# Patient Record
Sex: MALE | Race: WHITE | NOT HISPANIC OR LATINO | Employment: FULL TIME | URBAN - METROPOLITAN AREA
[De-identification: names, ages, dates, MRNs, and addresses within clinical notes are randomized per-mention and may not be internally consistent; named-entity substitution may affect disease eponyms.]

---

## 2020-10-19 ENCOUNTER — APPOINTMENT (OUTPATIENT)
Dept: GENERAL RADIOLOGY | Facility: CLINIC | Age: 60
End: 2020-10-19
Attending: EMERGENCY MEDICINE
Payer: COMMERCIAL

## 2020-10-19 ENCOUNTER — HOSPITAL ENCOUNTER (EMERGENCY)
Facility: CLINIC | Age: 60
Discharge: HOME | End: 2020-10-19
Attending: EMERGENCY MEDICINE | Admitting: EMERGENCY MEDICINE
Payer: COMMERCIAL

## 2020-10-19 VITALS
DIASTOLIC BLOOD PRESSURE: 82 MMHG | OXYGEN SATURATION: 96 % | SYSTOLIC BLOOD PRESSURE: 138 MMHG | RESPIRATION RATE: 18 BRPM | TEMPERATURE: 97.8 F | HEART RATE: 94 BPM

## 2020-10-19 DIAGNOSIS — J45.21 MILD INTERMITTENT ASTHMA WITH EXACERBATION: ICD-10-CM

## 2020-10-19 DIAGNOSIS — Z20.822 SUSPECTED COVID-19 VIRUS INFECTION: ICD-10-CM

## 2020-10-19 DIAGNOSIS — J20.9 ACUTE BRONCHITIS WITH COEXISTING CONDITION REQUIRING PROPHYLACTIC TREATMENT: ICD-10-CM

## 2020-10-19 DIAGNOSIS — J45.901 ASTHMA EXACERBATION: ICD-10-CM

## 2020-10-19 DIAGNOSIS — J45.20 INTERMITTENT ASTHMA: ICD-10-CM

## 2020-10-19 LAB
ALBUMIN SERPL-MCNC: 3.3 G/DL (ref 3.4–5)
ALP SERPL-CCNC: 132 U/L (ref 40–150)
ALT SERPL W P-5'-P-CCNC: 125 U/L (ref 0–70)
ANION GAP SERPL CALCULATED.3IONS-SCNC: 7 MMOL/L (ref 3–14)
AST SERPL W P-5'-P-CCNC: 107 U/L (ref 0–45)
BASOPHILS # BLD AUTO: 0 10E9/L (ref 0–0.2)
BASOPHILS NFR BLD AUTO: 0.2 %
BILIRUB SERPL-MCNC: 0.5 MG/DL (ref 0.2–1.3)
BUN SERPL-MCNC: 9 MG/DL (ref 7–30)
CALCIUM SERPL-MCNC: 8.7 MG/DL (ref 8.5–10.1)
CHLORIDE SERPL-SCNC: 100 MMOL/L (ref 94–109)
CO2 SERPL-SCNC: 27 MMOL/L (ref 20–32)
CREAT SERPL-MCNC: 0.9 MG/DL (ref 0.66–1.25)
CRP SERPL-MCNC: 57.8 MG/L (ref 0–8)
DIFFERENTIAL METHOD BLD: ABNORMAL
EOSINOPHIL # BLD AUTO: 0 10E9/L (ref 0–0.7)
EOSINOPHIL NFR BLD AUTO: 0 %
ERYTHROCYTE [DISTWIDTH] IN BLOOD BY AUTOMATED COUNT: 13.9 % (ref 10–15)
GFR SERPL CREATININE-BSD FRML MDRD: >90 ML/MIN/{1.73_M2}
GLUCOSE SERPL-MCNC: 98 MG/DL (ref 70–99)
HCT VFR BLD AUTO: 44.1 % (ref 40–53)
HGB BLD-MCNC: 14.8 G/DL (ref 13.3–17.7)
IMM GRANULOCYTES # BLD: 0 10E9/L (ref 0–0.4)
IMM GRANULOCYTES NFR BLD: 0.3 %
INTERPRETATION ECG - MUSE: NORMAL
LACTATE BLD-SCNC: 0.9 MMOL/L (ref 0.7–2)
LYMPHOCYTES # BLD AUTO: 0.7 10E9/L (ref 0.8–5.3)
LYMPHOCYTES NFR BLD AUTO: 10.6 %
MCH RBC QN AUTO: 30.7 PG (ref 26.5–33)
MCHC RBC AUTO-ENTMCNC: 33.6 G/DL (ref 31.5–36.5)
MCV RBC AUTO: 92 FL (ref 78–100)
MONOCYTES # BLD AUTO: 0.6 10E9/L (ref 0–1.3)
MONOCYTES NFR BLD AUTO: 10 %
NEUTROPHILS # BLD AUTO: 4.9 10E9/L (ref 1.6–8.3)
NEUTROPHILS NFR BLD AUTO: 78.9 %
NRBC # BLD AUTO: 0 10*3/UL
NRBC BLD AUTO-RTO: 0 /100
PLATELET # BLD AUTO: 370 10E9/L (ref 150–450)
POTASSIUM SERPL-SCNC: 3.6 MMOL/L (ref 3.4–5.3)
PROCALCITONIN SERPL-MCNC: 0.05 NG/ML
PROT SERPL-MCNC: 8 G/DL (ref 6.8–8.8)
RBC # BLD AUTO: 4.82 10E12/L (ref 4.4–5.9)
SARS-COV-2 RNA SPEC QL NAA+PROBE: NORMAL
SODIUM SERPL-SCNC: 134 MMOL/L (ref 133–144)
SPECIMEN SOURCE: NORMAL
TROPONIN I SERPL-MCNC: <0.015 UG/L (ref 0–0.04)
WBC # BLD AUTO: 6.1 10E9/L (ref 4–11)

## 2020-10-19 PROCEDURE — 84145 PROCALCITONIN (PCT): CPT | Performed by: EMERGENCY MEDICINE

## 2020-10-19 PROCEDURE — 83605 ASSAY OF LACTIC ACID: CPT | Performed by: EMERGENCY MEDICINE

## 2020-10-19 PROCEDURE — 87040 BLOOD CULTURE FOR BACTERIA: CPT | Mod: XU | Performed by: EMERGENCY MEDICINE

## 2020-10-19 PROCEDURE — 85025 COMPLETE CBC W/AUTO DIFF WBC: CPT | Performed by: EMERGENCY MEDICINE

## 2020-10-19 PROCEDURE — C9803 HOPD COVID-19 SPEC COLLECT: HCPCS

## 2020-10-19 PROCEDURE — 96374 THER/PROPH/DIAG INJ IV PUSH: CPT

## 2020-10-19 PROCEDURE — 250N000013 HC RX MED GY IP 250 OP 250 PS 637: Performed by: EMERGENCY MEDICINE

## 2020-10-19 PROCEDURE — 80053 COMPREHEN METABOLIC PANEL: CPT | Performed by: EMERGENCY MEDICINE

## 2020-10-19 PROCEDURE — 99285 EMERGENCY DEPT VISIT HI MDM: CPT | Mod: 25

## 2020-10-19 PROCEDURE — U0003 INFECTIOUS AGENT DETECTION BY NUCLEIC ACID (DNA OR RNA); SEVERE ACUTE RESPIRATORY SYNDROME CORONAVIRUS 2 (SARS-COV-2) (CORONAVIRUS DISEASE [COVID-19]), AMPLIFIED PROBE TECHNIQUE, MAKING USE OF HIGH THROUGHPUT TECHNOLOGIES AS DESCRIBED BY CMS-2020-01-R: HCPCS | Performed by: EMERGENCY MEDICINE

## 2020-10-19 PROCEDURE — 94640 AIRWAY INHALATION TREATMENT: CPT

## 2020-10-19 PROCEDURE — 86140 C-REACTIVE PROTEIN: CPT | Performed by: EMERGENCY MEDICINE

## 2020-10-19 PROCEDURE — 93005 ELECTROCARDIOGRAM TRACING: CPT

## 2020-10-19 PROCEDURE — 71045 X-RAY EXAM CHEST 1 VIEW: CPT

## 2020-10-19 PROCEDURE — 84484 ASSAY OF TROPONIN QUANT: CPT | Performed by: EMERGENCY MEDICINE

## 2020-10-19 PROCEDURE — 250N000011 HC RX IP 250 OP 636: Performed by: EMERGENCY MEDICINE

## 2020-10-19 RX ORDER — BENZONATATE 200 MG/1
200 CAPSULE ORAL 3 TIMES DAILY PRN
Qty: 20 CAPSULE | Refills: 0 | Status: SHIPPED | OUTPATIENT
Start: 2020-10-19 | End: 2022-12-23

## 2020-10-19 RX ORDER — ALBUTEROL SULFATE 0.83 MG/ML
5 SOLUTION RESPIRATORY (INHALATION) EVERY 4 HOURS PRN
Qty: 1 BOX | Refills: 0 | Status: SHIPPED | OUTPATIENT
Start: 2020-10-19 | End: 2023-02-18

## 2020-10-19 RX ORDER — PREDNISONE 20 MG/1
TABLET ORAL
Qty: 10 TABLET | Refills: 0 | Status: SHIPPED | OUTPATIENT
Start: 2020-10-19 | End: 2022-12-23

## 2020-10-19 RX ORDER — ALBUTEROL SULFATE 90 UG/1
2 AEROSOL, METERED RESPIRATORY (INHALATION)
Status: DISCONTINUED | OUTPATIENT
Start: 2020-10-19 | End: 2020-10-19 | Stop reason: HOSPADM

## 2020-10-19 RX ORDER — METHYLPREDNISOLONE SODIUM SUCCINATE 125 MG/2ML
125 INJECTION, POWDER, LYOPHILIZED, FOR SOLUTION INTRAMUSCULAR; INTRAVENOUS ONCE
Status: COMPLETED | OUTPATIENT
Start: 2020-10-19 | End: 2020-10-19

## 2020-10-19 RX ADMIN — ALBUTEROL SULFATE 2 PUFF: 90 AEROSOL, METERED RESPIRATORY (INHALATION) at 20:27

## 2020-10-19 RX ADMIN — METHYLPREDNISOLONE SODIUM SUCCINATE 125 MG: 125 INJECTION, POWDER, FOR SOLUTION INTRAMUSCULAR; INTRAVENOUS at 20:25

## 2020-10-19 ASSESSMENT — ENCOUNTER SYMPTOMS
SHORTNESS OF BREATH: 1
DIARRHEA: 1
NECK PAIN: 0
MYALGIAS: 1
COUGH: 1
CHEST TIGHTNESS: 1
CHILLS: 1
HEADACHES: 0
FEVER: 1
VOMITING: 0

## 2020-10-19 NOTE — ED AVS SNAPSHOT
[facility] Emergency Dept  [address] [facility] [address]  Phone: [phone]  Fax: [phone]                                    Timothy Balderas   MRN: [id number]    Department: [facility] Emergency Dept   Date of Visit: 10/19/2020           After Visit Summary Signature Page    I have received my discharge instructions, and my questions have been answered. I have discussed any challenges I see with this plan with the nurse or doctor.    ..........................................................................................................................................  Patient/Patient Representative Signature      ..........................................................................................................................................  Patient Representative Print Name and Relationship to Patient    ..................................................               ................................................  Date                                   Time    ..........................................................................................................................................  Reviewed by Signature/Title    ...................................................              ..............................................  Date                                               Time          22EPIC Rev 08/18

## 2020-10-20 LAB
LABORATORY COMMENT REPORT: NORMAL
SARS-COV-2 RNA SPEC QL NAA+PROBE: NEGATIVE
SPECIMEN SOURCE: NORMAL

## 2020-10-20 NOTE — ED PROVIDER NOTES
History     Chief Complaint:  Suspected Covid    HPI   Timothy Balderas is a 60 year old male with a history of asthma who presents with suspected COVID-19. 10 days ago the patient began having symptoms concerning for COVID-19 with fever, chills, and body aches which progressed to a cough 4 days ago. Over the last 2 days he has also had chest tightness and shortness of breath. He has a history of asthma and has been using his inhalers intermittently. He arrives today because of chest tightness and shortness of breath and spiked a fever today of 101.3. He denies chest pain.  He also complains of diarrhea but states he has had intermitted diarrhea for the past 6 weeks and tested negative for COVID-19 approximately 6 weeks ago. He also tested negative for COVID-19 about 1 week ago. The patient's wife and daughter were both diagnosed with COVID-19 recently. He denies vomiting, loss of taste or smell, leg pain or swelling, headache, neck pain, or history of DVT, PE, or cardiac problems.    Allergies:  Erythromycin     Medications:    Albuterol inhaler  Advair inhaler  Deltasone    Past Medical History:    Asthma    Past Surgical History:    ORIF left middle finger    Family History:    Hypertension   Heart disease, father  Musculoskeletal disorder  Migraines    Social History:  Smoking status: No  Alcohol use: Yes  Drug use: No  Patient presents alone.  PCP: Narendra Watkins    Marital Status:        Review of Systems   Constitutional: Positive for chills and fever.   Respiratory: Positive for cough, chest tightness and shortness of breath.    Cardiovascular: Negative for leg swelling.   Gastrointestinal: Positive for diarrhea. Negative for vomiting.   Musculoskeletal: Positive for myalgias. Negative for neck pain.   Neurological: Negative for headaches.   All other systems reviewed and are negative.    Physical Exam     Patient Vitals for the past 24 hrs:   BP Temp Temp src Pulse Resp SpO2   10/19/20 2130 138/82  -- -- 94 -- 96 %   10/19/20 2100 119/75 -- -- 78 -- 94 %   10/19/20 2030 119/73 -- -- 72 -- 96 %   10/19/20 2015 129/76 -- -- -- -- --   10/19/20 1842 -- 97.8  F (36.6  C) Temporal 91 18 97 %      Physical Exam  Nursing note and vitals reviewed.  Constitutional:  Appears well-developed and well-nourished. No visible respiratory distress.  Dry sounding cough.  HENT:   Head:    Atraumatic.   Mouth/Throat:   Oropharynx is clear and moist. No oropharyngeal exudate.   Eyes:    Pupils are equal, round, and reactive to light.   Neck:    Normal range of motion. Neck supple.      No tracheal deviation present. No thyromegaly present.   Cardiovascular:  Normal rate, regular rhythm, no murmur   Pulmonary/Chest: Bibasilar crackles with an occasional expiratory wheeze.  Dry cough.  No visible respiratory distress.  Abdominal:   Soft. Bowel sounds are normal. Exhibits no distension and      no mass. There is no tenderness.      There is no rebound and no guarding.   Musculoskeletal:  Exhibits no edema.   Lymphadenopathy:  No cervical adenopathy.   Neurological:   Alert and oriented to person, place, and time.   Skin:    Skin is warm and dry. No rash noted. No pallor.      Emergency Department Course   ECG:  @ 2011  Vent. Rate 73 bpm. WI interval 136 ms. QRS duration 88 ms. QT/QTc 398/438 ms. P-R-T axis 51 30 35.   Normal sinus rhythm. Normal ECG.   Read @ 2016 by Dr. Giordano.     Imaging:  Chest XR Port 1 View:  IMPRESSION: No acute disease.  Reading per radiology.     Radiographic findings were communicated with the patient who voiced understanding of the findings.    Laboratory:  CBC: WBC 6.1, HGB 14.8,      CMP: Albumin: 3.3 (L), ALT: 125 (H), AST: 107 (H), o/w WNL (Creatinine: 0.90)     2006 Troponin I: <0.015     2033 Lactic acid whole blood: 0.9    CRP inflammation: 57.8 (H)    Procalcitonin: 0.05    2005 Blood culture: In process  2005 Blood culture: In process    Symptomatic COVID-19 Virus by PCR: In process      Interventions:  2025 Solu-Medrol 125 mg IV  2027 Albuterol inhaler, 2 puffs given     Emergency Department Course:  1939 Nursing notes and vitals reviewed. I performed an exam of the patient as documented above.     EKG was done, interpretation as above.    IV inserted. Medicine administered as documented above. Blood drawn. This was sent to the lab for further testing, results above.    The patient was sent for a x-ray while in the emergency department, findings above.     2123 I rechecked the patient and discussed the results of his workup thus far.     Findings and plan explained to the Patient. Patient discharged home with instructions regarding supportive care, medications, and reasons to return. The importance of close follow-up was reviewed. The patient was prescribed Albuterol nebulizer, Tessalon, and prednisone.     I personally reviewed the laboratory and imaging results with the Patient and answered all related questions prior to discharge.     Impression & Plan    Covid-19  Timothy Balderas was evaluated during a global COVID-19 pandemic, which necessitated consideration that the patient might be at risk for infection with the SARS-CoV-2 virus that causes COVID-19.   Applicable protocols for evaluation were followed during the patient's care.   COVID-19 was considered as part of the patient's evaluation. The plan for testing is:  a test was obtained during this visit.    Medical Decision Making:  This patient has asthma and symptoms consistent with acute COVID-19 virus infection concerning for possible COVID pneumonia. The patient is not hypoxic and his chest x-ray is clear here with no visible pneumonia. He is afebrile here and does not have any signs of respiratory distress or hypoxic and he is not tachycardic and does not have any pleuritic chest pain so I felt that he could be safely discharged to home. There was also no sign of bacterial infection or bacterial pneumonia, he has a normal white  blood cell count and a very low procalcitonin. His CRP is elevated consistent with probable COVID-19 infection. I did not feel there was concern for acute myocardial infarction or coronary ischemia. His troponin was negative and ECG does not show any ischemic changes. He does appear septic or toxic here. I discussed with the patient that he does not meet criteria for hospital admission at this time considering the above but he is at risk for worsening so he needs to monitor his oxygen saturation at home. He has an oxygen saturation monitor to check himself at home. He was told to return if he develops decrease of his oxygen of his saturation below 92% or any other worsening of his condition. He was told to have a low threshold to return if his condition worsens but I felt he could be safely discharged to home at this time.    Diagnosis:    ICD-10-CM    1. Mild intermittent asthma with exacerbation  J45.21    2. Suspected COVID-19 virus infection  Z20.828    3. Asthma exacerbation  J45.901    4. Acute bronchitis with coexisting condition requiring prophylactic treatment  J20.9    5. Intermittent asthma  J45.20        Disposition:  discharged to home    Discharge Medications:  New Prescriptions    ALBUTEROL (PROVENTIL) (2.5 MG/3ML) 0.083% NEB SOLUTION    Take 2 vials (5 mg) by nebulization every 4 hours as needed for shortness of breath / dyspnea or wheezing    BENZONATATE (TESSALON) 200 MG CAPSULE    Take 1 capsule (200 mg) by mouth 3 times daily as needed for cough (swallow whole, do not chew)    PREDNISONE (DELTASONE) 20 MG TABLET    Take two tablets (= 40mg) each day for 5 (five) days       Joann Appiah  10/19/2020   [facility] EMERGENCY DEPT    Scribe Disclosure:  I, Joann Appiah, am serving as a scribe at 7:39 PM on 10/19/2020 to document services personally performed by Sonali Giordano MD based on my observations and the provider's statements to me.         Sonali Giordano,  MD  10/19/20 8079

## 2020-10-25 LAB
BACTERIA SPEC CULT: NO GROWTH
BACTERIA SPEC CULT: NO GROWTH
SPECIMEN SOURCE: NORMAL
SPECIMEN SOURCE: NORMAL

## 2020-10-26 ENCOUNTER — TELEPHONE (OUTPATIENT)
Dept: EMERGENCY MEDICINE | Facility: CLINIC | Age: 60
End: 2020-10-26

## 2020-10-26 NOTE — TELEPHONE ENCOUNTER
Coronavirus (COVID-19) Notification    Lab Result   Lab test 2019-nCoV rRt-PCR OR SARS-COV-2 PCR    Nasopharyngeal AND/OR Oropharyngeal swab is NEGATIVE for 2019-nCoV RNA [OR] SARS-COV-2 RNA (COVID-19) RNA    Your result was negative. This means that we didn't find the virus that causes COVID-19 in your sample. A test may show negative when you do actually have the virus. This can happen when the virus is in the early stages of infection, before you feel illness symptoms.    If you have symptoms   Stay home and away from others (self-isolate) until you meet ALL of the guidelines below:    You've had no fever--and no medicine that reduces fever--for 1 full day (24 hours). And      Your other symptoms have gotten better. For example, your cough or breathing has improved. And   ; At least 10 days have passed since your symptoms started. (If you've been told by a doctor that you have a weak immune system, wait 20 days.)         During this time:    Stay home. Don't go to work, school or anywhere else.     Stay in your own room, including for meals. Use your own bathroom if you can.    Stay away from others in your home. No hugging, kissing or shaking hands. No visitors.    Clean  high touch  surfaces often (doorknobs, counters, handles, etc.). Use a household cleaning spray or wipes. You can find a full list on the EPA website at www.epa.gov/pesticide-registration/list-n-disinfectants-use-against-sars-cov-2.    Cover your mouth and nose with a mask, tissue or other face covering to avoid spreading germs.    Wash your hands and face often with soap and water.    Going back to work  Check with your employer for any guidelines to follow for going back to work.  You are sent a letter for your Employer which will serve as formal document notice that you, the employee, tested negative for COVID-19, as of the testing date shown above.    If your symptoms worsen or other concerning symptoms, contact PCP, oncare or consider  returning to Emergency Dept.    Where can I get more information?    [facility]: [email or web address]    Coronavirus Basics: www.health.[facility].[place]/diseases/coronavirus/basics.html    [facility] Hotline (714-329-6234)    {Name]  Carolina Lam RN  [employer] RN  Lung Nodule and ED Lab Result RN  Epic pool (ED late result f/u RN): [id number]  [facility] INCIDENTAL RADIOLOGY F/U NURSES: [id number]  Ph# [phone]

## 2020-11-04 ENCOUNTER — TRANSFERRED RECORDS (OUTPATIENT)
Dept: HEALTH INFORMATION MANAGEMENT | Facility: CLINIC | Age: 60
End: 2020-11-04

## 2022-11-23 ENCOUNTER — TRANSFERRED RECORDS (OUTPATIENT)
Dept: HEALTH INFORMATION MANAGEMENT | Facility: CLINIC | Age: 62
End: 2022-11-23

## 2022-11-23 ENCOUNTER — MEDICAL CORRESPONDENCE (OUTPATIENT)
Dept: HEALTH INFORMATION MANAGEMENT | Facility: CLINIC | Age: 62
End: 2022-11-23

## 2022-11-25 DIAGNOSIS — R07.9 CHEST PAIN: Primary | ICD-10-CM

## 2022-11-25 DIAGNOSIS — Z82.49 FAMILY HISTORY OF CORONARY ARTERY DISEASE: ICD-10-CM

## 2022-11-30 ENCOUNTER — HOSPITAL ENCOUNTER (OUTPATIENT)
Dept: CARDIOLOGY | Facility: CLINIC | Age: 62
Discharge: HOME | End: 2022-11-30
Attending: FAMILY MEDICINE | Admitting: FAMILY MEDICINE
Payer: COMMERCIAL

## 2022-11-30 DIAGNOSIS — R07.9 CHEST PAIN: ICD-10-CM

## 2022-11-30 DIAGNOSIS — Z82.49 FAMILY HISTORY OF CORONARY ARTERY DISEASE: ICD-10-CM

## 2022-11-30 PROCEDURE — 93350 STRESS TTE ONLY: CPT | Mod: 26 | Performed by: INTERNAL MEDICINE

## 2022-11-30 PROCEDURE — 93325 DOPPLER ECHO COLOR FLOW MAPG: CPT | Mod: TC

## 2022-11-30 PROCEDURE — 93321 DOPPLER ECHO F-UP/LMTD STD: CPT | Mod: 26 | Performed by: INTERNAL MEDICINE

## 2022-11-30 PROCEDURE — 93321 DOPPLER ECHO F-UP/LMTD STD: CPT | Mod: TC

## 2022-11-30 PROCEDURE — 93018 CV STRESS TEST I&R ONLY: CPT | Performed by: INTERNAL MEDICINE

## 2022-11-30 PROCEDURE — 93325 DOPPLER ECHO COLOR FLOW MAPG: CPT | Mod: 26 | Performed by: INTERNAL MEDICINE

## 2022-11-30 PROCEDURE — 93016 CV STRESS TEST SUPVJ ONLY: CPT | Performed by: INTERNAL MEDICINE

## 2022-12-02 ENCOUNTER — OFFICE VISIT (OUTPATIENT)
Dept: CARDIOLOGY | Facility: CLINIC | Age: 62
End: 2022-12-02
Payer: COMMERCIAL

## 2022-12-02 VITALS
HEART RATE: 66 BPM | HEIGHT: 68 IN | BODY MASS INDEX: 26.67 KG/M2 | DIASTOLIC BLOOD PRESSURE: 74 MMHG | WEIGHT: 176 LBS | SYSTOLIC BLOOD PRESSURE: 138 MMHG

## 2022-12-02 DIAGNOSIS — R94.39 ABNORMAL CARDIOVASCULAR STRESS TEST: Primary | ICD-10-CM

## 2022-12-02 DIAGNOSIS — Z82.49 FAMILY HISTORY OF CORONARY ARTERY DISEASE: ICD-10-CM

## 2022-12-02 PROCEDURE — 99205 OFFICE O/P NEW HI 60 MIN: CPT | Performed by: INTERNAL MEDICINE

## 2022-12-02 RX ORDER — ATORVASTATIN CALCIUM 40 MG/1
40 TABLET, FILM COATED ORAL DAILY
Qty: 90 TABLET | Refills: 3 | Status: SHIPPED | OUTPATIENT
Start: 2022-12-02 | End: 2023-11-21

## 2022-12-02 RX ORDER — ATORVASTATIN CALCIUM 10 MG/1
40 TABLET, FILM COATED ORAL EVERY 24 HOURS
COMMUNITY
Start: 2022-02-17 | End: 2022-12-02

## 2022-12-02 RX ORDER — FLUTICASONE FUROATE AND VILANTEROL 100; 25 UG/1; UG/1
1 POWDER RESPIRATORY (INHALATION) DAILY PRN
COMMUNITY

## 2022-12-02 RX ORDER — LOSARTAN POTASSIUM 50 MG/1
50 TABLET ORAL AT BEDTIME
Status: ON HOLD | COMMUNITY
Start: 2022-01-17 | End: 2023-02-20

## 2022-12-02 RX ORDER — METOPROLOL TARTRATE 25 MG/1
25 TABLET, FILM COATED ORAL 2 TIMES DAILY
Qty: 180 TABLET | Refills: 3 | Status: ON HOLD | OUTPATIENT
Start: 2022-12-02 | End: 2023-02-20

## 2022-12-02 RX ORDER — NIACIN 500 MG
500 TABLET ORAL DAILY
COMMUNITY

## 2022-12-02 RX ORDER — FAMOTIDINE 20 MG/1
20 TABLET, FILM COATED ORAL 2 TIMES DAILY
COMMUNITY
Start: 2022-02-17

## 2022-12-02 NOTE — LETTER
12/2/2022    Narendra Watkins MD  [address]  [facility] [address]    RE: Timothy Mejiaer       Dear Colleague,     I had the pleasure of seeing Timothy Balderas in the [facility].  HPI and Plan:   Mr Balderas is a very pleasant and delightful 62-year gentleman who is here to establish cardiology care.  Patient recently had an exercise stress echocardiogram because of exertional chest discomfort.  He overall exercised just over 10 minutes but had chest discomfort which resolved with physical activity and had abnormal stress EKG with 2 mm horizontal ST depression in inferior leads and lateral precordial leads and 1 mm ST elevation in lead aVR.  Echocardiogram portion was normal without any evidence of inducible ischemia.  Patient does have family history of premature coronary disease with father having coronary disease in early 50s and eventually had a bypass surgery.  Patient had some CT scan done last year due to COVID and a scan in December 2021 showed severe coronary calcification.  He has been on Lipitor 10 mg daily since then and baby aspirin.  Latest LDL is 77.  Latest labs showed normal liver functions.  Patient himself is experience exertional chest discomfort like with biking about 10 minutes into the biking he had some burning chest discomfort but it last for short duration and goes away when he stops.  No resting chest discomfort.  No bleeding issues.  No anticipated surgery.  He does not use any tobacco.  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  LV function was normal at baseline on stress test    Assessment and plan  1.  Coronary disease on the basis of severe coronary calcification.  Symptoms of exertional chest discomfort concerning for exertional angina.  Stress test was abnormal in terms of subjective symptoms of angina and abnormal stress EKG of the echo was normal.  I had a long discussion with patient regarding the sensitivity and specifically of stress  test.  We discussed options for further evaluation to rule out obstructive coronary disease.  We discussed option of coronary CT angiogram versus traditional coronary angiogram.  Given severe coronary artery calcification coronary CT angiogram will not be optimal.  Risk benefits of traditional coronary angiogram with risks including but not limited to risk of stroke, MI, death, need for PRBC transfusion were discussed with patient.  We also discussed option of following medical management.  Patient understands the rational of cholangiogram, the risk involved and the alternative of only medical therapy and wishes to proceed with it.  I recommend no strenuous physical activity till cholangiogram is done.  If resting chest discomfort patient should call 911 and to be noted he did not have any resting chest discomfort.  I recommend continuing aspirin.  Recommend increasing Lipitor to 40 mg daily.  Common side effect of statins were discussed with patient.  I recommend repeating lipid panel with ALT in 4 weeks.  I also recommend adding metoprolol 25 mg twice daily for his antianginal action.  Common side effects were discussed with patient.  He also has hypertension and is on losartan.  2.  LDL 77 on 10 mg of Lipitor  3.  Hypertension on losartan      Recommendations  Continue aspirin 81 mg daily  Increased Lipitor to 40 mg daily  Check lipid panel with ALT in 4 weeks  Metoprolol tartrate 25 mg twice daily  Coronary angiogram with possible revascularization  No strenuous physical activity: Angiogram is done  Call 911 if resting chest discomfort.    Today's clinic visit entailed:      The level of medical decision making during this visit was of high complexity.      Orders Placed This Encounter   Procedures     Lipid Profile     ALT     Follow-Up with Cardiology KRISTEN     Case Request Cath Lab: Coronary Angiogram       Orders Placed This Encounter   Medications     fluticasone-vilanterol (BREO ELLIPTA) 100-25 MCG/ACT  inhaler     Sig: Inhale 1 puff into the lungs daily as needed     DISCONTD: atorvastatin (LIPITOR) 10 MG tablet     Sig: Take 40 mg by mouth every 24 hours     losartan (COZAAR) 50 MG tablet     Sig: Take 50 mg by mouth At Bedtime     Aspirin 81 MG CAPS     niacin 500 MG tablet     Sig: Take by mouth every other day     famotidine (PEPCID) 20 MG tablet     Sig: Take by mouth every 12 hours     atorvastatin (LIPITOR) 40 MG tablet     Sig: Take 1 tablet (40 mg) by mouth daily     Dispense:  90 tablet     Refill:  3     metoprolol tartrate (LOPRESSOR) 25 MG tablet     Sig: Take 1 tablet (25 mg) by mouth 2 times daily     Dispense:  180 tablet     Refill:  3       Medications Discontinued During This Encounter   Medication Reason     atorvastatin (LIPITOR) 10 MG tablet          Encounter Diagnoses   Name Primary?     Chest pain      Family history of coronary artery disease      Abnormal cardiovascular stress test Yes       CURRENT MEDICATIONS:  Current Outpatient Medications   Medication Sig Dispense Refill     albuterol (PROVENTIL HFA) 108 (90 BASE) MCG/ACT inhaler Inhale 2 puffs into the lungs every 6 hours 1 Inhaler 0     albuterol (PROVENTIL HFA: VENTOLIN HFA) 108 (90 BASE) MCG/ACT inhaler Inhale 2 puffs into the lungs every 6 hours as needed for shortness of breath / dyspnea. 1 Inhaler 6     albuterol (PROVENTIL) (2.5 MG/3ML) 0.083% neb solution Take 2 vials (5 mg) by nebulization every 4 hours as needed for shortness of breath / dyspnea or wheezing 1 Box 0     Aspirin 81 MG CAPS        atorvastatin (LIPITOR) 40 MG tablet Take 1 tablet (40 mg) by mouth daily 90 tablet 3     azelastine (ASTELIN) 137 MCG/SPRAY nasal spray Spray 2 sprays into both nostrils as needed for rhinitis       famotidine (PEPCID) 20 MG tablet Take by mouth every 12 hours       FLUTICASONE PROPIONATE, NASAL, NA Spray in nostril 2 times daily       fluticasone-vilanterol (BREO ELLIPTA) 100-25 MCG/ACT inhaler Inhale 1 puff into the lungs daily  as needed       losartan (COZAAR) 50 MG tablet Take 50 mg by mouth At Bedtime       metoprolol tartrate (LOPRESSOR) 25 MG tablet Take 1 tablet (25 mg) by mouth 2 times daily 180 tablet 3     niacin 500 MG tablet Take by mouth every other day       triamcinolone (KENALOG) 0.1 % ointment APPLY SPARINGLY TO HANDS TWICE DAILY AS NEEDED TO CONTROL CONDITION. 30 g 0     amoxicillin-clavulanate (AUGMENTIN) 875-125 MG per tablet Take 1 tablet by mouth 2 times daily (Patient not taking: Reported on 2022) 20 tablet 0     benzonatate (TESSALON) 200 MG capsule Take 1 capsule (200 mg) by mouth 3 times daily as needed for cough (swallow whole, do not chew) (Patient not taking: Reported on 2022) 20 capsule 0     fluticasone-salmeterol (ADVAIR) 250-50 MCG/DOSE diskus inhaler Inhale 1 puff into the lungs 2 times daily. (Patient not taking: Reported on 2022) 3 Inhaler 3     predniSONE (DELTASONE) 20 MG tablet Take two tablets (= 40mg) each day for 5 (five) days (Patient not taking: Reported on 2022) 10 tablet 0       ALLERGIES     Allergies   Allergen Reactions     Erythromycin        PAST MEDICAL HISTORY:  Past Medical History:   Diagnosis Date     Unspecified asthma(493.90)        PAST SURGICAL HISTORY:  Past Surgical History:   Procedure Laterality Date     [facility] NONSPECIFIC PROCEDURE      ORIF L middle finger       FAMILY HISTORY:  Family History   Problem Relation Age of Onset     Hypertension Mother         alive age 73 arthritis     Heart Disease Father         alive age 73     Musculoskeletal Disorder Sister          age 42 ALS     Neurologic Disorder Sister         alive age 43 migranes     Family History Negative Sister         alive age 41     Breast Cancer Paternal Aunt              Breast Cancer Paternal Aunt              Breast Cancer Paternal Aunt              Diabetes Maternal Aunt          late 20's       SOCIAL HISTORY:  Social History  "    Socioeconomic History     Marital status:      Spouse name: Jeannette     Number of children: 3     Years of education: None     Highest education level: None   Occupational History     Occupation: clinical research manager     Comment: advanced respiratory   Tobacco Use     Smoking status: Never     Smokeless tobacco: Never   Substance and Sexual Activity     Alcohol use: Yes     Comment: occasional     Drug use: No     Sexual activity: Yes     Partners: Female       Review of Systems:  Skin:          Eyes:         ENT:         Respiratory:  Positive for   asthma, has chronic throat clearing   Cardiovascular:  Negative;palpitations;syncope or near-syncope;cyanosis;lightheadedness;dizziness;fatigue Positive for;exercise intolerance feels L sided maurilio twinge occ. when exercising  Gastroenterology:        Genitourinary:         Musculoskeletal:         Neurologic:         Psychiatric:         Heme/Lymph/Imm:         Endocrine:  Negative        Physical Exam:  Vitals: /74   Pulse 66   Ht 1.715 m (5' 7.5\")   Wt 79.8 kg (176 lb)   BMI 27.16 kg/m      General patient appears comfortable  Neck normal JVP  Cardiovascular system S1-S2 normal no murmur rub or gallop next respite system clear to auscultation   Extremities no edema  Neurological alert, oriented    CC  Ghazal Flores PA-C  [address]  [place] [address]    Thank you for allowing me to participate in the care of your patient.      Sincerely,     Deric Garcia MD     [facility]  "

## 2022-12-02 NOTE — PROGRESS NOTES
HPI and Plan:   Mr Balderas is a very pleasant and delightful 62-year gentleman who is here to establish cardiology care.  Patient recently had an exercise stress echocardiogram because of exertional chest discomfort.  He overall exercised just over 10 minutes but had chest discomfort which resolved with physical activity and had abnormal stress EKG with 2 mm horizontal ST depression in inferior leads and lateral precordial leads and 1 mm ST elevation in lead aVR.  Echocardiogram portion was normal without any evidence of inducible ischemia.  Patient does have family history of premature coronary disease with father having coronary disease in early 50s and eventually had a bypass surgery.  Patient had some CT scan done last year due to COVID and a scan in December 2021 showed severe coronary calcification.  He has been on Lipitor 10 mg daily since then and baby aspirin.  Latest LDL is 77.  Latest labs showed normal liver functions.  Patient himself is experience exertional chest discomfort like with biking about 10 minutes into the biking he had some burning chest discomfort but it last for short duration and goes away when he stops.  No resting chest discomfort.  No bleeding issues.  No anticipated surgery.  He does not use any tobacco.  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  LV function was normal at baseline on stress test    Assessment and plan  1.  Coronary disease on the basis of severe coronary calcification.  Symptoms of exertional chest discomfort concerning for exertional angina.  Stress test was abnormal in terms of subjective symptoms of angina and abnormal stress EKG of the echo was normal.  I had a long discussion with patient regarding the sensitivity and specifically of stress test.  We discussed options for further evaluation to rule out obstructive coronary disease.  We discussed option of coronary CT angiogram versus traditional coronary angiogram.  Given severe coronary artery  calcification coronary CT angiogram will not be optimal.  Risk benefits of traditional coronary angiogram with risks including but not limited to risk of stroke, MI, death, need for PRBC transfusion were discussed with patient.  We also discussed option of medical management alone.  Patient understands the rational of coronary angiogram, the risk involved and the alternative of only medical therapy and wishes to proceed with it.  I recommend no strenuous physical activity till coronary angiogram is done.  If resting chest discomfort patient should call 911 and to be noted he did not have any resting chest discomfort.  I recommend continuing aspirin.  Recommend increasing Lipitor to 40 mg daily.  Common side effect of statins were discussed with patient.  I recommend repeating lipid panel with ALT in 4 weeks.  I also recommend adding metoprolol 25 mg twice daily for antianginal action.  Common side effects were discussed with patient.  He also has hypertension and is on losartan.  2.  LDL 77 on 10 mg of Lipitor  3.  Hypertension on losartan      Recommendations  Continue aspirin 81 mg daily  Increased Lipitor to 40 mg daily  Check lipid panel with ALT in 4 weeks  Metoprolol tartrate 25 mg twice daily  Coronary angiogram with possible revascularization  No strenuous physical activity till coronary angiogram is done  Call 911 if resting chest discomfort.    Today's clinic visit entailed:      The level of medical decision making during this visit was of high complexity.      Orders Placed This Encounter   Procedures     Lipid Profile     ALT     Follow-Up with Cardiology KRISTEN     Case Request Cath Lab: Coronary Angiogram       Orders Placed This Encounter   Medications     fluticasone-vilanterol (BREO ELLIPTA) 100-25 MCG/ACT inhaler     Sig: Inhale 1 puff into the lungs daily as needed     DISCONTD: atorvastatin (LIPITOR) 10 MG tablet     Sig: Take 40 mg by mouth every 24 hours     losartan (COZAAR) 50 MG tablet      Sig: Take 50 mg by mouth At Bedtime     Aspirin 81 MG CAPS     niacin 500 MG tablet     Sig: Take by mouth every other day     famotidine (PEPCID) 20 MG tablet     Sig: Take by mouth every 12 hours     atorvastatin (LIPITOR) 40 MG tablet     Sig: Take 1 tablet (40 mg) by mouth daily     Dispense:  90 tablet     Refill:  3     metoprolol tartrate (LOPRESSOR) 25 MG tablet     Sig: Take 1 tablet (25 mg) by mouth 2 times daily     Dispense:  180 tablet     Refill:  3       Medications Discontinued During This Encounter   Medication Reason     atorvastatin (LIPITOR) 10 MG tablet          Encounter Diagnoses   Name Primary?     Chest pain      Family history of coronary artery disease      Abnormal cardiovascular stress test Yes       CURRENT MEDICATIONS:  Current Outpatient Medications   Medication Sig Dispense Refill     albuterol (PROVENTIL HFA) 108 (90 BASE) MCG/ACT inhaler Inhale 2 puffs into the lungs every 6 hours 1 Inhaler 0     albuterol (PROVENTIL HFA: VENTOLIN HFA) 108 (90 BASE) MCG/ACT inhaler Inhale 2 puffs into the lungs every 6 hours as needed for shortness of breath / dyspnea. 1 Inhaler 6     albuterol (PROVENTIL) (2.5 MG/3ML) 0.083% neb solution Take 2 vials (5 mg) by nebulization every 4 hours as needed for shortness of breath / dyspnea or wheezing 1 Box 0     Aspirin 81 MG CAPS        atorvastatin (LIPITOR) 40 MG tablet Take 1 tablet (40 mg) by mouth daily 90 tablet 3     azelastine (ASTELIN) 137 MCG/SPRAY nasal spray Spray 2 sprays into both nostrils as needed for rhinitis       famotidine (PEPCID) 20 MG tablet Take by mouth every 12 hours       FLUTICASONE PROPIONATE, NASAL, NA Spray in nostril 2 times daily       fluticasone-vilanterol (BREO ELLIPTA) 100-25 MCG/ACT inhaler Inhale 1 puff into the lungs daily as needed       losartan (COZAAR) 50 MG tablet Take 50 mg by mouth At Bedtime       metoprolol tartrate (LOPRESSOR) 25 MG tablet Take 1 tablet (25 mg) by mouth 2 times daily 180 tablet 3      niacin 500 MG tablet Take by mouth every other day       triamcinolone (KENALOG) 0.1 % ointment APPLY SPARINGLY TO HANDS TWICE DAILY AS NEEDED TO CONTROL CONDITION. 30 g 0     amoxicillin-clavulanate (AUGMENTIN) 875-125 MG per tablet Take 1 tablet by mouth 2 times daily (Patient not taking: Reported on 2022) 20 tablet 0     benzonatate (TESSALON) 200 MG capsule Take 1 capsule (200 mg) by mouth 3 times daily as needed for cough (swallow whole, do not chew) (Patient not taking: Reported on 2022) 20 capsule 0     fluticasone-salmeterol (ADVAIR) 250-50 MCG/DOSE diskus inhaler Inhale 1 puff into the lungs 2 times daily. (Patient not taking: Reported on 2022) 3 Inhaler 3     predniSONE (DELTASONE) 20 MG tablet Take two tablets (= 40mg) each day for 5 (five) days (Patient not taking: Reported on 2022) 10 tablet 0       ALLERGIES     Allergies   Allergen Reactions     Erythromycin        PAST MEDICAL HISTORY:  Past Medical History:   Diagnosis Date     Unspecified asthma(493.90)        PAST SURGICAL HISTORY:  Past Surgical History:   Procedure Laterality Date     ZZC NONSPECIFIC PROCEDURE      ORIF L middle finger       FAMILY HISTORY:  Family History   Problem Relation Age of Onset     Hypertension Mother         alive age 73 arthritis     Heart Disease Father         alive age 73     Musculoskeletal Disorder Sister          age 42 ALS     Neurologic Disorder Sister         alive age 43 migranes     Family History Negative Sister         alive age 41     Breast Cancer Paternal Aunt              Breast Cancer Paternal Aunt              Breast Cancer Paternal Aunt              Diabetes Maternal Aunt          late 20's       SOCIAL HISTORY:  Social History     Socioeconomic History     Marital status:      Spouse name: Jeannette     Number of children: 3     Years of education: None     Highest education level: None   Occupational History     Occupation:  "clinical research manager     Comment: [employer]   Tobacco Use     Smoking status: Never     Smokeless tobacco: Never   Substance and Sexual Activity     Alcohol use: Yes     Comment: occasional     Drug use: No     Sexual activity: Yes     Partners: Female       Review of Systems:  Skin:          Eyes:         ENT:         Respiratory:  Positive for   asthma, has chronic throat clearing   Cardiovascular:  Negative;palpitations;syncope or near-syncope;cyanosis;lightheadedness;dizziness;fatigue Positive for;exercise intolerance feels L sided maurilio twinge occ. when exercising  Gastroenterology:        Genitourinary:         Musculoskeletal:         Neurologic:         Psychiatric:         Heme/Lymph/Imm:         Endocrine:  Negative        Physical Exam:  Vitals: /74   Pulse 66   Ht 1.715 m (5' 7.5\")   Wt 79.8 kg (176 lb)   BMI 27.16 kg/m      General patient appears comfortable  Neck normal JVP  Cardiovascular system S1-S2 normal no murmur rub or gallop next respite system clear to auscultation   Extremities no edema  Neurological alert, oriented    CC  Ghazal Flores PA-C  [id number]  [place] [id number]              "

## 2022-12-08 DIAGNOSIS — R07.9 EXERTIONAL CHEST PAIN: Primary | ICD-10-CM

## 2022-12-08 DIAGNOSIS — I25.10 CORONARY ARTERY DISEASE: ICD-10-CM

## 2022-12-08 RX ORDER — ASPIRIN 81 MG/1
243 TABLET, CHEWABLE ORAL ONCE
Status: CANCELLED | OUTPATIENT
Start: 2022-12-08

## 2022-12-08 RX ORDER — LIDOCAINE 40 MG/G
CREAM TOPICAL
Status: CANCELLED | OUTPATIENT
Start: 2022-12-08

## 2022-12-08 RX ORDER — SODIUM CHLORIDE 9 MG/ML
INJECTION, SOLUTION INTRAVENOUS CONTINUOUS
Status: CANCELLED | OUTPATIENT
Start: 2022-12-08

## 2022-12-08 RX ORDER — ASPIRIN 325 MG
325 TABLET ORAL ONCE
Status: CANCELLED | OUTPATIENT
Start: 2022-12-08 | End: 2022-12-08

## 2022-12-08 RX ORDER — POTASSIUM CHLORIDE 1500 MG/1
20 TABLET, EXTENDED RELEASE ORAL
Status: CANCELLED | OUTPATIENT
Start: 2022-12-08

## 2022-12-09 ENCOUNTER — TELEPHONE (OUTPATIENT)
Dept: CARDIOLOGY | Facility: CLINIC | Age: 62
End: 2022-12-09

## 2022-12-09 NOTE — TELEPHONE ENCOUNTER
Coronary angiogram/PCI/Right Heart Cath prep instructions.     Patient is scheduled for a Coronary Angiogram at [facility] - [address], [place] [address] - Main Entrance of the Hospital, on 12/12.  Check in time is at 8:30 and procedure to follow.    Patient instructed to remain NPO for solid foods 8 hours prior to arrival and may have clear liquids up to 2 hours prior to arrival.    Patient does not require extra fluids prior to procedure.    Patient is not diabetic.    Patient is not on anticoagulation.      Patient is taking ASA 81mg daily and will take 4 tabs (324mg) the morning of the procedure.    Pt is not on a SGLT2 inhibitor.    Patient advised to take their other daily medications the morning of the procedure with small sips of water.     Verified patient does not have a contrast allergy.    Verified patient has someone available to drive them home from the hospital and can stay with them for 24 hours after the procedure.     Patient advised to notify care team with any new COVID like symptoms prior to procedure.    Patient will check their temperature the morning of procedure and call [facility] at [phone] if temp is >100.0.    Patient is aware of visitor policy.    Patient expresses understanding of above instructions and denies further questions at this time.      [facility]

## 2022-12-12 ENCOUNTER — TRANSFERRED RECORDS (OUTPATIENT)
Dept: HEALTH INFORMATION MANAGEMENT | Facility: CLINIC | Age: 62
End: 2022-12-12

## 2022-12-12 ENCOUNTER — HOSPITAL ENCOUNTER (OUTPATIENT)
Facility: CLINIC | Age: 62
Discharge: HOME | End: 2022-12-12
Admitting: INTERNAL MEDICINE
Payer: COMMERCIAL

## 2022-12-12 ENCOUNTER — RESEARCH ENCOUNTER (OUTPATIENT)
Dept: CARDIOLOGY | Facility: CLINIC | Age: 62
End: 2022-12-12

## 2022-12-12 VITALS
RESPIRATION RATE: 16 BRPM | WEIGHT: 175 LBS | DIASTOLIC BLOOD PRESSURE: 55 MMHG | TEMPERATURE: 98.5 F | OXYGEN SATURATION: 100 % | SYSTOLIC BLOOD PRESSURE: 101 MMHG | BODY MASS INDEX: 26.52 KG/M2 | HEART RATE: 46 BPM | HEIGHT: 68 IN

## 2022-12-12 DIAGNOSIS — I25.10 CORONARY ARTERY DISEASE: ICD-10-CM

## 2022-12-12 DIAGNOSIS — R94.39 ABNORMAL CARDIOVASCULAR STRESS TEST: Primary | ICD-10-CM

## 2022-12-12 DIAGNOSIS — Z95.5 S/P DRUG ELUTING CORONARY STENT PLACEMENT: ICD-10-CM

## 2022-12-12 DIAGNOSIS — R94.39 ABNORMAL CARDIOVASCULAR STRESS TEST: ICD-10-CM

## 2022-12-12 DIAGNOSIS — Z00.6 EXAMINATION OF PARTICIPANT OR CONTROL IN CLINICAL RESEARCH: ICD-10-CM

## 2022-12-12 DIAGNOSIS — Z00.6 EXAMINATION OF PARTICIPANT OR CONTROL IN CLINICAL RESEARCH: Primary | ICD-10-CM

## 2022-12-12 DIAGNOSIS — R07.9 EXERTIONAL CHEST PAIN: ICD-10-CM

## 2022-12-12 PROBLEM — Z98.890 STATUS POST CORONARY ANGIOGRAM: Status: ACTIVE | Noted: 2022-12-12

## 2022-12-12 LAB
ACT BLD: 250 SECONDS (ref 74–150)
ACT BLD: 254 SECONDS (ref 74–150)
ACT BLD: 263 SECONDS (ref 74–150)
ACT BLD: 518 SECONDS (ref 74–150)
ANION GAP SERPL CALCULATED.3IONS-SCNC: 5 MMOL/L (ref 3–14)
APTT PPP: 27 SECONDS (ref 22–38)
BUN SERPL-MCNC: 15 MG/DL (ref 7–30)
CALCIUM SERPL-MCNC: 8.9 MG/DL (ref 8.5–10.1)
CHLORIDE BLD-SCNC: 109 MMOL/L (ref 94–109)
CO2 SERPL-SCNC: 27 MMOL/L (ref 20–32)
CREAT SERPL-MCNC: 1.01 MG/DL (ref 0.66–1.25)
ERYTHROCYTE [DISTWIDTH] IN BLOOD BY AUTOMATED COUNT: 13.3 % (ref 10–15)
GFR SERPL CREATININE-BSD FRML MDRD: 83 ML/MIN/1.73M2
GLUCOSE BLD-MCNC: 99 MG/DL (ref 70–99)
HCT VFR BLD AUTO: 41.4 % (ref 40–53)
HGB BLD-MCNC: 14.1 G/DL (ref 13.3–17.7)
INR PPP: 1.04 (ref 0.85–1.15)
MCH RBC QN AUTO: 31 PG (ref 26.5–33)
MCHC RBC AUTO-ENTMCNC: 34.1 G/DL (ref 31.5–36.5)
MCV RBC AUTO: 91 FL (ref 78–100)
PLATELET # BLD AUTO: 369 10E3/UL (ref 150–450)
POTASSIUM BLD-SCNC: 4.3 MMOL/L (ref 3.4–5.3)
RBC # BLD AUTO: 4.55 10E6/UL (ref 4.4–5.9)
SODIUM SERPL-SCNC: 141 MMOL/L (ref 133–144)
WBC # BLD AUTO: 7 10E3/UL (ref 4–11)

## 2022-12-12 PROCEDURE — 250N000009 HC RX 250: Performed by: INTERNAL MEDICINE

## 2022-12-12 PROCEDURE — 250N000013 HC RX MED GY IP 250 OP 250 PS 637: Performed by: INTERNAL MEDICINE

## 2022-12-12 PROCEDURE — 92978 ENDOLUMINL IVUS OCT C 1ST: CPT | Mod: LD | Performed by: INTERNAL MEDICINE

## 2022-12-12 PROCEDURE — C9600 PERC DRUG-EL COR STENT SING: HCPCS | Mod: LD | Performed by: INTERNAL MEDICINE

## 2022-12-12 PROCEDURE — C1894 INTRO/SHEATH, NON-LASER: HCPCS | Performed by: INTERNAL MEDICINE

## 2022-12-12 PROCEDURE — 85347 COAGULATION TIME ACTIVATED: CPT

## 2022-12-12 PROCEDURE — 93571 IV DOP VEL&/PRESS C FLO 1ST: CPT | Performed by: INTERNAL MEDICINE

## 2022-12-12 PROCEDURE — 92978 ENDOLUMINL IVUS OCT C 1ST: CPT | Mod: 26 | Performed by: INTERNAL MEDICINE

## 2022-12-12 PROCEDURE — 92928 PRQ TCAT PLMT NTRAC ST 1 LES: CPT | Mod: LD | Performed by: INTERNAL MEDICINE

## 2022-12-12 PROCEDURE — C1769 GUIDE WIRE: HCPCS | Performed by: INTERNAL MEDICINE

## 2022-12-12 PROCEDURE — 93454 CORONARY ARTERY ANGIO S&I: CPT | Mod: 26 | Performed by: INTERNAL MEDICINE

## 2022-12-12 PROCEDURE — 85027 COMPLETE CBC AUTOMATED: CPT | Performed by: INTERNAL MEDICINE

## 2022-12-12 PROCEDURE — 99153 MOD SED SAME PHYS/QHP EA: CPT | Performed by: INTERNAL MEDICINE

## 2022-12-12 PROCEDURE — 999N000054 HC STATISTIC EKG NON-CHARGEABLE

## 2022-12-12 PROCEDURE — 99152 MOD SED SAME PHYS/QHP 5/>YRS: CPT | Performed by: INTERNAL MEDICINE

## 2022-12-12 PROCEDURE — 999N000071 HC STATISTIC HEART CATH LAB OR EP LAB

## 2022-12-12 PROCEDURE — C1874 STENT, COATED/COV W/DEL SYS: HCPCS | Performed by: INTERNAL MEDICINE

## 2022-12-12 PROCEDURE — C1725 CATH, TRANSLUMIN NON-LASER: HCPCS | Performed by: INTERNAL MEDICINE

## 2022-12-12 PROCEDURE — 258N000003 HC RX IP 258 OP 636: Performed by: INTERNAL MEDICINE

## 2022-12-12 PROCEDURE — C1753 CATH, INTRAVAS ULTRASOUND: HCPCS | Performed by: INTERNAL MEDICINE

## 2022-12-12 PROCEDURE — 999N000184 HC STATISTIC TELEMETRY

## 2022-12-12 PROCEDURE — 250N000011 HC RX IP 250 OP 636: Performed by: INTERNAL MEDICINE

## 2022-12-12 PROCEDURE — 82310 ASSAY OF CALCIUM: CPT | Performed by: INTERNAL MEDICINE

## 2022-12-12 PROCEDURE — 93010 ELECTROCARDIOGRAM REPORT: CPT | Mod: 76 | Performed by: INTERNAL MEDICINE

## 2022-12-12 PROCEDURE — 85730 THROMBOPLASTIN TIME PARTIAL: CPT | Performed by: INTERNAL MEDICINE

## 2022-12-12 PROCEDURE — 36415 COLL VENOUS BLD VENIPUNCTURE: CPT | Performed by: INTERNAL MEDICINE

## 2022-12-12 PROCEDURE — 93454 CORONARY ARTERY ANGIO S&I: CPT | Mod: 59 | Performed by: INTERNAL MEDICINE

## 2022-12-12 PROCEDURE — 93005 ELECTROCARDIOGRAM TRACING: CPT | Mod: 59 | Performed by: INTERNAL MEDICINE

## 2022-12-12 PROCEDURE — C1887 CATHETER, GUIDING: HCPCS | Performed by: INTERNAL MEDICINE

## 2022-12-12 PROCEDURE — 36591 DRAW BLOOD OFF VENOUS DEVICE: CPT

## 2022-12-12 PROCEDURE — 93571 IV DOP VEL&/PRESS C FLO 1ST: CPT | Mod: 26 | Performed by: INTERNAL MEDICINE

## 2022-12-12 PROCEDURE — 86141 C-REACTIVE PROTEIN HS: CPT | Performed by: INTERNAL MEDICINE

## 2022-12-12 PROCEDURE — 85610 PROTHROMBIN TIME: CPT | Performed by: INTERNAL MEDICINE

## 2022-12-12 PROCEDURE — 93005 ELECTROCARDIOGRAM TRACING: CPT

## 2022-12-12 PROCEDURE — 99152 MOD SED SAME PHYS/QHP 5/>YRS: CPT | Mod: GC | Performed by: INTERNAL MEDICINE

## 2022-12-12 PROCEDURE — 272N000001 HC OR GENERAL SUPPLY STERILE: Performed by: INTERNAL MEDICINE

## 2022-12-12 DEVICE — STENT CORONARY DES SYNERGY XD MR US 2.50X38MM H7493941838250: Type: IMPLANTABLE DEVICE | Status: FUNCTIONAL

## 2022-12-12 RX ORDER — ONDANSETRON 4 MG/1
4 TABLET, ORALLY DISINTEGRATING ORAL EVERY 6 HOURS PRN
Status: DISCONTINUED | OUTPATIENT
Start: 2022-12-12 | End: 2022-12-12 | Stop reason: HOSPADM

## 2022-12-12 RX ORDER — METOPROLOL TARTRATE 1 MG/ML
5 INJECTION, SOLUTION INTRAVENOUS
Status: DISCONTINUED | OUTPATIENT
Start: 2022-12-12 | End: 2022-12-12 | Stop reason: HOSPADM

## 2022-12-12 RX ORDER — LIDOCAINE 40 MG/G
CREAM TOPICAL
Status: DISCONTINUED | OUTPATIENT
Start: 2022-12-12 | End: 2022-12-12 | Stop reason: HOSPADM

## 2022-12-12 RX ORDER — ADENOSINE 3 MG/ML
INJECTION, SOLUTION INTRAVENOUS
Status: DISCONTINUED | OUTPATIENT
Start: 2022-12-12 | End: 2022-12-12 | Stop reason: HOSPADM

## 2022-12-12 RX ORDER — ASPIRIN 325 MG
325 TABLET ORAL ONCE
Status: COMPLETED | OUTPATIENT
Start: 2022-12-12 | End: 2022-12-12

## 2022-12-12 RX ORDER — ASPIRIN 81 MG/1
243 TABLET, CHEWABLE ORAL ONCE
Status: COMPLETED | OUTPATIENT
Start: 2022-12-12 | End: 2022-12-12

## 2022-12-12 RX ORDER — NITROGLYCERIN 5 MG/ML
VIAL (ML) INTRAVENOUS
Status: DISCONTINUED | OUTPATIENT
Start: 2022-12-12 | End: 2022-12-12 | Stop reason: HOSPADM

## 2022-12-12 RX ORDER — ACETAMINOPHEN 325 MG/1
650 TABLET ORAL EVERY 4 HOURS PRN
Status: DISCONTINUED | OUTPATIENT
Start: 2022-12-12 | End: 2022-12-12 | Stop reason: HOSPADM

## 2022-12-12 RX ORDER — HEPARIN SODIUM 1000 [USP'U]/ML
INJECTION, SOLUTION INTRAVENOUS; SUBCUTANEOUS
Status: DISCONTINUED | OUTPATIENT
Start: 2022-12-12 | End: 2022-12-12 | Stop reason: HOSPADM

## 2022-12-12 RX ORDER — NITROGLYCERIN 0.4 MG/1
0.4 TABLET SUBLINGUAL EVERY 5 MIN PRN
Status: DISCONTINUED | OUTPATIENT
Start: 2022-12-12 | End: 2022-12-12 | Stop reason: HOSPADM

## 2022-12-12 RX ORDER — HYDRALAZINE HYDROCHLORIDE 20 MG/ML
10 INJECTION INTRAMUSCULAR; INTRAVENOUS EVERY 4 HOURS PRN
Status: DISCONTINUED | OUTPATIENT
Start: 2022-12-12 | End: 2022-12-12 | Stop reason: HOSPADM

## 2022-12-12 RX ORDER — SODIUM CHLORIDE 9 MG/ML
INJECTION, SOLUTION INTRAVENOUS CONTINUOUS
Status: ACTIVE | OUTPATIENT
Start: 2022-12-12 | End: 2022-12-12

## 2022-12-12 RX ORDER — ASPIRIN 81 MG/1
81 TABLET, CHEWABLE ORAL ONCE
Status: DISCONTINUED | OUTPATIENT
Start: 2022-12-12 | End: 2022-12-12 | Stop reason: HOSPADM

## 2022-12-12 RX ORDER — SODIUM CHLORIDE 9 MG/ML
INJECTION, SOLUTION INTRAVENOUS CONTINUOUS
Status: DISCONTINUED | OUTPATIENT
Start: 2022-12-12 | End: 2022-12-12 | Stop reason: HOSPADM

## 2022-12-12 RX ORDER — ONDANSETRON 2 MG/ML
4 INJECTION INTRAMUSCULAR; INTRAVENOUS EVERY 6 HOURS PRN
Status: DISCONTINUED | OUTPATIENT
Start: 2022-12-12 | End: 2022-12-12 | Stop reason: HOSPADM

## 2022-12-12 RX ORDER — FENTANYL CITRATE 50 UG/ML
INJECTION, SOLUTION INTRAMUSCULAR; INTRAVENOUS
Status: DISCONTINUED | OUTPATIENT
Start: 2022-12-12 | End: 2022-12-12 | Stop reason: HOSPADM

## 2022-12-12 RX ORDER — ASPIRIN 81 MG/1
81 TABLET ORAL DAILY
Status: DISCONTINUED | OUTPATIENT
Start: 2022-12-13 | End: 2022-12-12 | Stop reason: HOSPADM

## 2022-12-12 RX ORDER — POTASSIUM CHLORIDE 1500 MG/1
20 TABLET, EXTENDED RELEASE ORAL
Status: DISCONTINUED | OUTPATIENT
Start: 2022-12-12 | End: 2022-12-12 | Stop reason: HOSPADM

## 2022-12-12 RX ORDER — VERAPAMIL HYDROCHLORIDE 2.5 MG/ML
INJECTION, SOLUTION INTRAVENOUS
Status: DISCONTINUED | OUTPATIENT
Start: 2022-12-12 | End: 2022-12-12 | Stop reason: HOSPADM

## 2022-12-12 RX ADMIN — SODIUM CHLORIDE: 9 INJECTION, SOLUTION INTRAVENOUS at 09:05

## 2022-12-12 ASSESSMENT — ACTIVITIES OF DAILY LIVING (ADL)
ADLS_ACUITY_SCORE: 35

## 2022-12-12 NOTE — Clinical Note
Max pressure = 12 marco. Total duration = 10 seconds.     Max pressure = 12 marco. Total duration = 10 seconds.    Balloon reinflated a second time: Max pressure = 12 marco. Total duration = 10 seconds.

## 2022-12-12 NOTE — Clinical Note
The first balloon was inserted into the left anterior descending.Max pressure = 14 marco. Total duration = 10 seconds.     Max pressure = 20 marco. Total duration = 10 seconds.    Balloon reinflated a second time: Max pressure = 20 marco. Total duration = 10 seconds.

## 2022-12-12 NOTE — PRE-PROCEDURE
GENERAL PRE-PROCEDURE:     Written consent obtained?: Yes    Risks and benefits: Risks, benefits and alternatives were discussed    DC Plan: Appropriate discharge home plan in place for patients who are going home after procedure   Consent given by:  Patient  Patient states understanding of procedure being performed: Yes    Patient's understanding of procedure matches consent: Yes    Procedure consent matches procedure scheduled: Yes    Expected level of sedation:  Moderate  Appropriately NPO:  Yes  ASA Class:  4  Mallampati  :  Grade 3- soft palate visible, posterior pharyngeal wall not visible  Lungs:  Lungs clear with good breath sounds bilaterally  Heart:  Normal heart sounds and rate  History & Physical reviewed:  History and physical reviewed and no updates needed  Statement of review:  I have reviewed the lab findings, diagnostic data, medications, and the plan for sedation

## 2022-12-12 NOTE — PROGRESS NOTES
Radial procedural site CDI. TR band reduced without incident. Removed after 5 minutes. Within 5 minutes developed dime size hematoma right at the site. Manual pressure held and TR band replaced. Checked by cath lab staff. 9 ml placed in band. Dr. Alonso notified. Per verbal order, will keep band on for 60 minutes and then just remove and watch for standing order of 1 hour. Currently site looks WDL. No hematoma or bleeding. Slight bruising under TR band at this time. Report to Patricia MONTELONGO RN. Pt and wife aware.

## 2022-12-12 NOTE — Clinical Note
The first balloon was inserted into the left anterior descending.Max pressure = 20 marco. Total duration = 11 seconds.     Max pressure = 20 marco. Total duration = 9 seconds.    Balloon reinflated a second time: Max pressure = 20 marco. Total duration = 9 seconds.  Balloon reinflated a third time:

## 2022-12-12 NOTE — PROGRESS NOTES
Care Suites Post Procedure Note    Patient Information  Name: Timothy Balderas  Age: 62 year old    Post Procedure  Time patient returned to Care Suites: 1215  Concerns/abnormal assessment: none  If abnormal assessment, provider notified: Yes  Plan/Other: bedrest x 2 hours    Patricia Hodges RN

## 2022-12-12 NOTE — DISCHARGE INSTRUCTIONS
Cardiac Angioplasty/Stent Discharge Instructions - Radial    After you go home:    Have an adult stay with you until tomorrow.  Drink extra fluids for 2 days.  You may resume your normal diet.  No smoking       For 24 hours - due to the sedation you received:  Relax and take it easy.  Do NOT make any important or legal decisions.  Do NOT drive or operate machines at home or at work.  Do NOT drink alcohol.    Care of Wrist Puncture Site:    For the first 24 hrs - check the puncture site every 1-2 hours while awake.  It is normal to have soreness at the puncture site and mild tingling in your hand for up to 3 days.  Remove the bandaid after 24 hours. If there is minor oozing, apply another bandaid and remove it after 12 hours.  You may shower tomorrow.  Do NOT take a bath, or use a hot tub or pool for at least 3 days. Do NOT scrub the site. Do not use lotion or powder near the puncture site.           Activity:          For 2 days:   do not use your hand or arm to support your weight (such as rising from a chair)   do not bend your wrist (such as lifting a garage door).  do not lift more than 5 pounds or exercise your arm (such as tennis, golf or bowling).  Do NOT do any heavy activity such as exercise, lifting, or straining.     Bleeding:    If you start bleeding from the site in your wrist, sit down and press firmly on/above the site for 10 minutes.   Once bleeding stops, keep arm still for 2 hours.   Call [facility] as soon as you can.       Call 911 right away if you have heavy bleeding or bleeding that does not stop.      Medicines:    If you are taking an antiplatelet medication such as Plavix, Brilinta or Effient, do not stop taking it until you talk to your cardiologist.      Take your medications, including blood thinners, unless your provider tells you not to.    If you have stopped any medicines, check with your provider about when to restart them.    Follow Up Appointments:    Follow up with [facility]  Nurse Practitioner at [facility] of patient preference in 7-10 days.  Cardiac Rehab will contact you for follow up care.    Call the clinic if:    You have a large or growing hard lump around the site.  The site is red, swollen, hot or tender.  Blood or fluid is draining from the site.  You have chills or a fever greater than 101 F (38 C).  Your arm feels numb, cool or changes color.  You have hives, a rash or unusual itching.  Any questions or concerns.    Other Instructions:    If you received a stent - carry your stent card with you at all times.      [facility] [place]:    [phone] [facility] (7 days a week)

## 2022-12-12 NOTE — Clinical Note
Stent deployed in the left anterior descending. Max pressure = 12 marco. Total duration = 10 seconds.

## 2022-12-12 NOTE — Clinical Note
The first balloon was inserted into the left anterior descending.Max pressure = 12 marco. Total duration = 10 seconds.

## 2022-12-12 NOTE — Clinical Note
The first balloon was inserted into the left anterior descending.Max pressure = 20 marco. Total duration = 10 seconds.     Max pressure = 20 marco. Total duration = 5 seconds.    Balloon reinflated a second time: Max pressure = 20 marco. Total duration = 5 seconds.

## 2022-12-12 NOTE — PROGRESS NOTES
Care Suites Admission Nursing Note    Patient Information  Name: Timothy Balderas  Age: 62 year old  Reason for admission: Heart Cath  Care Suites arrival time: 0830    Visitor Information  Name: Jeannette  Informed of visitor restrictions: Yes  1 visitor allowed per patient   Visitor must screen negative for COVID symptoms   Visitor must wear a mask  Waiting rooms closed to visitors    Patient Admission/Assessment   Pre-procedure assessment complete: Yes  If abnormal assessment/labs, provider notified: N/A  NPO: Yes  Medications held per instructions/orders: Yes  Consent: obtained  If applicable, pregnancy test status: deferred  Patient oriented to room: Yes  Education/questions answered: Yes  Plan/other: getting prepped for their procedure    Discharge Planning  Discharge name/phone number: Jeannette  [phone]  Overnight post sedation caregiver: Jeannette  Discharge location: [place]    Tari Odonnell RN

## 2022-12-12 NOTE — Clinical Note
The first balloon was inserted into the left anterior descending.Max pressure = 12 marco. Total duration = 8 seconds.     Max pressure = 12 marco. Total duration = 10 seconds.    Balloon reinflated a second time: Max pressure = 12 marco. Total duration = 10 seconds.

## 2022-12-13 ENCOUNTER — TELEPHONE (OUTPATIENT)
Dept: CARDIOLOGY | Facility: CLINIC | Age: 62
End: 2022-12-13

## 2022-12-13 DIAGNOSIS — I25.10 CAD (CORONARY ARTERY DISEASE): Primary | ICD-10-CM

## 2022-12-13 LAB
ATRIAL RATE - MUSE: 43 BPM
CRP SERPL HS-MCNC: 0.16 MG/L
DIASTOLIC BLOOD PRESSURE - MUSE: NORMAL MMHG
INTERPRETATION ECG - MUSE: NORMAL
P AXIS - MUSE: 46 DEGREES
PR INTERVAL - MUSE: 160 MS
QRS DURATION - MUSE: 80 MS
QT - MUSE: 470 MS
QTC - MUSE: 397 MS
R AXIS - MUSE: 53 DEGREES
SYSTOLIC BLOOD PRESSURE - MUSE: NORMAL MMHG
T AXIS - MUSE: 54 DEGREES
VENTRICULAR RATE- MUSE: 43 BPM

## 2022-12-13 RX ORDER — NITROGLYCERIN 0.4 MG/1
TABLET SUBLINGUAL
Qty: 30 TABLET | Refills: 11 | Status: SHIPPED | OUTPATIENT
Start: 2022-12-13 | End: 2023-12-19

## 2022-12-13 NOTE — TELEPHONE ENCOUNTER
Writer called and spoke with pt. Pt denies any chest pain, SOB or lightheadedness.    RRA access site is healing without signs of infection, swelling or bleeding.    Denies any medication questions, states he has an adequate supply of Brilinta and reminded of importance of taking every 12 hrs without interruption.    Per chart review, no known allergy to nitrates. Pt denies any use of Phosphodiesterase Type 5 Inhibitors. Writer instructed pt on PRN SL Nitroglycerin, including indications, storage and expiration period once bottle opened, administration, expected side effects and when to call the clinic vs 911. Pt verbalized understanding and RX escribed to pt's [facility] per his request.    Reviewed f/u OV as below.    Cardiac rehab order placed and pt informed they will be in contact with him to schedule.    Pt verbalized understanding of all instructions without further questions. DAISY Daily RN.

## 2022-12-13 NOTE — PROGRESS NOTES
Timothy Balderas a 62 year old male , was seen in clinic today to discuss participation in the DEFINE GPS study.     The consent discussion began on 12/12/2022.  The consent form was reviewed with the patient.     The consent discussion included:    Study description and purpose     Conflict of interest    Device description     Randomization  (1:1 investigational device:control device)    Study visits    Study medications    Risks of participation    Benefits (if any)    Alternatives    Voluntary participation    Confidentiality     Compensation/costs of participation    Injury and legal rights  The subject and his wife were provided time to review the consent form and consider participation. Their questions were answered to his satisfaction. The patient has voluntarily agreed to participate in the above noted study.     The consent form and HIPPA form version dated 12/03/2020, revised 9/13/2021 was signed 12/13/22.    The subject was provided with a copy of the consent form and HIPPA.    Patient was randomized to the GPS arm of the study. Phone follow-up 30 days, 6, 12 and 24 mos per protocol.     Lorna Moreno RN

## 2022-12-13 NOTE — TELEPHONE ENCOUNTER
Thanks for letting me know, Kathy.  Would one of you mind placing a cardiac rehab order and a Rx for typical PRN SL NTG Rx under my name and I'll co-sign it?  30 tabs with 11 refills is fine.       Thanks,     Chandler

## 2022-12-13 NOTE — PROGRESS NOTES
Care Suites Discharge Nursing Note    Patient Information  Name: Timothy Balderas  Age: 62 year old    Discharge Education:  Discharge instructions reviewed: Yes  Additional education/resources provided: yes  Patient/patient representative verbalizes understanding: Yes  Patient discharging on new medications: Yes  Medication education completed: Yes    Discharge Plans:   Discharge location: home  Discharge ride contacted: Yes  Approximate discharge time: 1740    Discharge Criteria:  Discharge criteria met and vital signs stable: Yes    Patient Belongs:  Patient belongings returned to patient: Yes    Patricia Hodges RN

## 2022-12-13 NOTE — TELEPHONE ENCOUNTER
Patient was admitted to [facility] on 12/12/22 for OP diagnostic LHC secondary to exertional angina and abnormal OP stress test.    PMH: HTN    12/12/22: Coronary angiogram via RRA showed single vessel CAD with diffuse moderate proximal-mid LAD disease which is flow-limiting with IFR of 0.89 and FFR of 0.78. Mild, nonobstructive CAD elsewhere. Successful PCI of the proximal-mid LAD with a single 2.5 x 38 mm Synergy JOHN, post dilated up to 3.4 mm proximally.     Pt was started on Brilinta and ASA at time of discharge.    RN will confirm with patient that he was d/c with an adequate supply of the antiplatelet Brilinta, and will be reminded of importance of taking without interruption.     Pt DOES NOT have an Rx for PRN SL Nitroglycerin.     Pt also does not have an order in place for cardiac rehab. Will route this note to interventionalist Dr. Alonso for further review and orders. Writer will then call the pt.    Pt is scheduled for cardiology KRISTEN OV on 12/23/22 at 1425 with Dedra Jerry at our [place] Office. DAISY Daily RN.

## 2022-12-14 LAB
ATRIAL RATE - MUSE: 41 BPM
DIASTOLIC BLOOD PRESSURE - MUSE: NORMAL MMHG
INTERPRETATION ECG - MUSE: NORMAL
P AXIS - MUSE: 44 DEGREES
PR INTERVAL - MUSE: 140 MS
QRS DURATION - MUSE: 80 MS
QT - MUSE: 492 MS
QTC - MUSE: 405 MS
R AXIS - MUSE: 40 DEGREES
SYSTOLIC BLOOD PRESSURE - MUSE: NORMAL MMHG
T AXIS - MUSE: 37 DEGREES
VENTRICULAR RATE- MUSE: 41 BPM

## 2022-12-23 ENCOUNTER — OFFICE VISIT (OUTPATIENT)
Dept: CARDIOLOGY | Facility: CLINIC | Age: 62
End: 2022-12-23
Payer: COMMERCIAL

## 2022-12-23 VITALS
HEART RATE: 56 BPM | BODY MASS INDEX: 26.37 KG/M2 | WEIGHT: 174 LBS | HEIGHT: 68 IN | SYSTOLIC BLOOD PRESSURE: 117 MMHG | DIASTOLIC BLOOD PRESSURE: 62 MMHG

## 2022-12-23 DIAGNOSIS — E78.5 HYPERLIPIDEMIA LDL GOAL <70: ICD-10-CM

## 2022-12-23 DIAGNOSIS — I25.10 CORONARY ARTERY DISEASE INVOLVING NATIVE CORONARY ARTERY OF NATIVE HEART WITHOUT ANGINA PECTORIS: Primary | ICD-10-CM

## 2022-12-23 PROCEDURE — 99214 OFFICE O/P EST MOD 30 MIN: CPT | Performed by: NURSE PRACTITIONER

## 2022-12-23 NOTE — LETTER
12/23/2022    Narendra Watkins MD  [address]  [facility] [address]    RE: Timothy Balderas       Dear Colleague,     I had the pleasure of seeing Timothy Balderas in the [facility].    General Cardiology Clinic Progress Note  Timothy Balderas MRN# [id number]   YOB: 1960 Age: 62 year old     Primary cardiologist: Dr. Garcia    Reason for visit: Follow-up angiogram    History of presenting illness:    Timothy Balderas, a pleasant 62 year old patient who has a past medical history significant for family history of premature CAD with his father having coronary disease in his early 50s and undergoing bypass, hyperlipidemia and previously noted coronary calcifications on CT scan.    The patient was seen in consultation by Dr. Garcia on 12/2/2022 due to exertional chest discomfort and an abnormal stress EKG.  There was 2 mm horizontal ST depression in inferior leads and lateral precordial leads and 1 mm ST elevation in lead aVR.  There was no inducible ischemia on the echocardiogram portion.  The patient and Dr. Garcia discussed proceeding with a CT coronary angiogram versus an invasive coronary angiogram and it was elected to proceed with the latter.  He was started on metoprolol 25 mg twice daily and his prior Lipitor was increased from 10 mg daily to 40 mg daily.    The angiogram was completed on 12/12/2022 and he was found to have single-vessel disease of the proximal to mid LAD that was hemodynamically significant (iFR 0.89 and FFR of 0.78).  There was mild nonobstructive disease elsewhere.  The proximal mid LAD was treated with 1 drug-eluting stent.  He was placed on DAPT with aspirin and ticagrelor for total of 12 months and would consider indefinite P2Y 12 inhibitor monotherapy afterwards.    Today he reports that he has had no recurrent symptoms although he did had only minor symptoms at higher levels of exercise prior to intervention.  Blood pressures well controlled and he is  tolerating the increased dose of Lipitor.  He is unsure the cost of Brilinta, but instructed him to call if it is cost prohibitive.    Diagnostic studies:    Stress echocardiogram (11/30/2022): Echocardiogram was negative for ischemia however ECG portion was positive for ischemic changes.    Coronary angiogram (12/12/2022): 30% proximal LAD, 40% mid LAD (lesion 1), 60% mid LAD (lesion 2) that was 30% stenosed into a sidebranch of the third diagonal.  The second LAD lesion was hemodynamically significant (iFR 0.89 and FFR 0.78) and was treated with a drug-eluting stent.  OM was 40% stenosed          Assessment and Plan:     ASSESSMENT:    1. Coronary artery disease    Status post drug-eluting stent to the LAD with mild nonobstructive disease elsewhere    DAPT with aspirin and Brilinta    Lipitor was increased from 10 mg daily to 40 mg daily    Last LDL 77 (11/23/2022)    2. Hypertension    Well controlled     Losartan 50 mg daily and metoprolol 25 mg twice daily    PLAN:     1. Please call if Brilinta is cost prohibitive as of Jan 1 and will transition to Plavix   2. Please call if concern with metoprolol as reports lower resting HR  3. Gradually restart exercise and start cardiac rehab  4. Continue all other medications without changes.  5. Please follow up with Dedra in 3 months.       Orders this Visit:  Orders Placed This Encounter   Procedures     Follow-Up with Cardiology KRISTEN     No orders of the defined types were placed in this encounter.    Medications Discontinued During This Encounter   Medication Reason     amoxicillin-clavulanate (AUGMENTIN) 875-125 MG per tablet Therapy completed     benzonatate (TESSALON) 200 MG capsule Stopped by Patient     fluticasone-salmeterol (ADVAIR) 250-50 MCG/DOSE diskus inhaler Stopped by Patient     predniSONE (DELTASONE) 20 MG tablet Stopped by Patient     albuterol (PROVENTIL HFA) 108 (90 BASE) MCG/ACT inhaler Medication Reconciliation Clean Up       Today's clinic visit  "entailed:  Review of the result(s) of each unique test - SE, cath  Prescription drug management  20 minutes spent on the date of the encounter doing chart review, history and exam, documentation and further activities per the note  Provider  Link to [facility] Help Grid     The level of medical decision making during this visit was of moderate complexity.           Review of Systems:     Review of Systems:  Skin:  Negative     Eyes:  Negative    ENT:  Negative    Respiratory:  Positive for shortness of breath;cough;mucoid expectorant  Cardiovascular:  Negative    Gastroenterology: Negative    Genitourinary:  Negative    Musculoskeletal:  Negative    Neurologic:  Negative    Psychiatric:  Negative    Heme/Lymph/Imm:  Positive for allergies  Endocrine:  Negative              Physical Exam:     Vitals: /62   Pulse 56   Ht 1.715 m (5' 7.5\")   Wt 78.9 kg (174 lb)   BMI 26.85 kg/m    Constitutional: Well nourished and in no apparent distress.  Eyes: Pupils equal, round. Sclerae anicteric.   HEENT: Normocephalic, atraumatic.   Neck: Supple. JVD   Respiratory: Breathing non-labored. Lungs clear to auscultation bilaterally. No crackles, wheezes, rhonchi, or rales.  Cardiovascular:  Regular rate and rhythm, normal S1 and S2. No murmur, rub, or gallop.  Skin: Warm, dry. No rashes, cyanosis, or xanthelasma.  Extremities: RRA site with bruising and small hematoma  Neurologic: No gross motor deficits. Alert, awake, and oriented to person, place and time.  Psychiatric: Affect appropriate.             Medications:     Current Outpatient Medications   Medication Sig Dispense Refill     albuterol (PROVENTIL HFA: VENTOLIN HFA) 108 (90 BASE) MCG/ACT inhaler Inhale 2 puffs into the lungs every 6 hours as needed for shortness of breath / dyspnea. 1 Inhaler 6     albuterol (PROVENTIL) (2.5 MG/3ML) 0.083% neb solution Take 2 vials (5 mg) by nebulization every 4 hours as needed for shortness of breath / dyspnea or wheezing 1 Box 0     " Aspirin 81 MG CAPS        atorvastatin (LIPITOR) 40 MG tablet Take 1 tablet (40 mg) by mouth daily 90 tablet 3     azelastine (ASTELIN) 137 MCG/SPRAY nasal spray Spray 2 sprays into both nostrils as needed for rhinitis       famotidine (PEPCID) 20 MG tablet Take by mouth every 12 hours       FLUTICASONE PROPIONATE, NASAL, NA Spray in nostril 2 times daily       fluticasone-vilanterol (BREO ELLIPTA) 100-25 MCG/ACT inhaler Inhale 1 puff into the lungs daily as needed       losartan (COZAAR) 50 MG tablet Take 50 mg by mouth At Bedtime       metoprolol tartrate (LOPRESSOR) 25 MG tablet Take 1 tablet (25 mg) by mouth 2 times daily 180 tablet 3     niacin 500 MG tablet Take by mouth every other day       nitroGLYcerin (NITROSTAT) 0.4 MG sublingual tablet For chest pain place 1 tablet under the tongue every 5 minutes for 3 doses. If symptoms persist 5 minutes after 2nd dose call 911. 30 tablet 11     ticagrelor (BRILINTA) 90 MG tablet Take 1 tablet (90 mg) by mouth 2 times daily Start this evening. 180 tablet 3     triamcinolone (KENALOG) 0.1 % ointment APPLY SPARINGLY TO HANDS TWICE DAILY AS NEEDED TO CONTROL CONDITION. 30 g 0       Family History   Problem Relation Age of Onset     Hypertension Mother         alive age 73 arthritis     Heart Disease Father         alive age 73     Musculoskeletal Disorder Sister          age 42 ALS     Neurologic Disorder Sister         alive age 43 migranes     Family History Negative Sister         alive age 41     Breast Cancer Paternal Aunt              Breast Cancer Paternal Aunt              Breast Cancer Paternal Aunt              Diabetes Maternal Aunt          late 20's       Social History     Socioeconomic History     Marital status:      Spouse name: Jeannette     Number of children: 3     Years of education: Not on file     Highest education level: Not on file   Occupational History     Occupation: clinical research manager      Comment: advanced respiratory   Tobacco Use     Smoking status: Never     Smokeless tobacco: Never   Vaping Use     Vaping Use: Never used   Substance and Sexual Activity     Alcohol use: Yes     Comment: occasional     Drug use: No     Sexual activity: Yes     Partners: Female   Other Topics Concern     Not on file   Social History Narrative     Not on file     Social Determinants of Health     Financial Resource Strain: Not on file   Food Insecurity: Not on file   Transportation Needs: Not on file   Physical Activity: Not on file   Stress: Not on file   Social Connections: Not on file   Intimate Partner Violence: Not on file   Housing Stability: Not on file            Past Medical History:     Past Medical History:   Diagnosis Date     Unspecified asthma(493.90) 1963              Past Surgical History:     Past Surgical History:   Procedure Laterality Date     CV CORONARY ANGIOGRAM N/A 12/12/2022    Procedure: Coronary Angiogram;  Surgeon: Xavier Alonso MD;  Location: [facility] CARDIAC CATH LAB     CV FRACTIONAL FLOW RATIO WIRE N/A 12/12/2022    Procedure: Fractional Flow Ratio Wire;  Surgeon: Xavier Alonso MD;  Location: [facility] CARDIAC CATH LAB     CV INSTANTANEOUS WAVE-FREE RATIO N/A 12/12/2022    Procedure: Instantaneous Wave-Free Ratio;  Surgeon: Xavier Alonso MD;  Location: [facility] CARDIAC CATH LAB     CV INTRAVASULAR ULTRASOUND N/A 12/12/2022    Procedure: Intravascular Ultrasound;  Surgeon: Xavier Alonso MD;  Location: [facility] CARDIAC CATH LAB     CV PCI STENT DRUG ELUTING N/A 12/12/2022    Procedure: Percutaneous Coronary Intervention Stent;  Surgeon: Xavier Alonso MD;  Location: [facility] CARDIAC CATH LAB     ZZC NONSPECIFIC PROCEDURE  1985    ORIF L middle finger              Allergies:   Erythromycin       Data:   All laboratory data reviewed:    Recent Labs   Lab Test 11/23/22  1000   TRIG 55       Lab Results   Component Value Date    WBC 7.0  12/12/2022    WBC 6.1 10/19/2020    RBC 4.55 12/12/2022    RBC 4.82 10/19/2020    HGB 14.1 12/12/2022    HGB 14.8 10/19/2020    HCT 41.4 12/12/2022    HCT 44.1 10/19/2020    MCV 91 12/12/2022    MCV 92 10/19/2020    MCH 31.0 12/12/2022    MCH 30.7 10/19/2020    MCHC 34.1 12/12/2022    MCHC 33.6 10/19/2020    RDW 13.3 12/12/2022    RDW 13.9 10/19/2020     12/12/2022     10/19/2020       Lab Results   Component Value Date     12/12/2022     10/19/2020    POTASSIUM 4.3 12/12/2022    POTASSIUM 3.6 10/19/2020    CHLORIDE 109 12/12/2022    CHLORIDE 102 11/23/2022    CHLORIDE 100 10/19/2020    CO2 27 12/12/2022    CO2 27 10/19/2020    ANIONGAP 5 12/12/2022    ANIONGAP 7 10/19/2020    GLC 99 12/12/2022    GLC 98 10/19/2020    BUN 15 12/12/2022    BUN 9 10/19/2020    CR 1.01 12/12/2022    CR 0.90 10/19/2020    GFRESTIMATED 83 12/12/2022    GFRESTIMATED >90 10/19/2020    GFRESTBLACK >90 10/19/2020    KAI 8.9 12/12/2022    KAI 8.7 10/19/2020      Lab Results   Component Value Date     (H) 10/19/2020     (H) 10/19/2020       No results found for: A1C    Lab Results   Component Value Date    INR 1.04 12/12/2022    INR 1.00 08/08/2005         JORGE MCALLISTER CNP  [facility]  Pager: [phone]  RN phone: [phone]      Thank you for allowing me to participate in the care of your patient.      Sincerely,     JORGE MCALLISTER CNP     [facility]  cc:   No referring provider defined for this encounter.

## 2022-12-23 NOTE — PATIENT INSTRUCTIONS
Today's Recommendations    Please call if Brilinta is cost prohibitive as of Jan 1 and will transition to Plavix   Please call if concern with metoprolol  Gradually restart exercise and start cardiac rehab  Continue all other medications without changes.  Please follow up with Dedra in 3 months.    Please send a [employer] message or call [phone] to the RN team with questions or concerns.     Scheduling number [phone]    JORGE Pineda, CNP

## 2022-12-23 NOTE — PROGRESS NOTES
General Cardiology Clinic Progress Note  Timothy Balderas MRN# [id number]   YOB: 1960 Age: 62 year old     Primary cardiologist: Dr. Garcia    Reason for visit: Follow-up angiogram    History of presenting illness:    Timothy Balderas, a pleasant 62 year old patient who has a past medical history significant for family history of premature CAD with his father having coronary disease in his early 50s and undergoing bypass, hyperlipidemia and previously noted coronary calcifications on CT scan.    The patient was seen in consultation by Dr. Garcia on 12/2/2022 due to exertional chest discomfort and an abnormal stress EKG.  There was 2 mm horizontal ST depression in inferior leads and lateral precordial leads and 1 mm ST elevation in lead aVR.  There was no inducible ischemia on the echocardiogram portion.  The patient and Dr. Garcia discussed proceeding with a CT coronary angiogram versus an invasive coronary angiogram and it was elected to proceed with the latter.  He was started on metoprolol 25 mg twice daily and his prior Lipitor was increased from 10 mg daily to 40 mg daily.    The angiogram was completed on 12/12/2022 and he was found to have single-vessel disease of the proximal to mid LAD that was hemodynamically significant (iFR 0.89 and FFR of 0.78).  There was mild nonobstructive disease elsewhere.  The proximal mid LAD was treated with 1 drug-eluting stent.  He was placed on DAPT with aspirin and ticagrelor for total of 12 months and would consider indefinite P2Y 12 inhibitor monotherapy afterwards.    Today he reports that he has had no recurrent symptoms although he did had only minor symptoms at higher levels of exercise prior to intervention.  Blood pressures well controlled and he is tolerating the increased dose of Lipitor.  He is unsure the cost of Brilinta, but instructed him to call if it is cost prohibitive.    Diagnostic studies:    Stress echocardiogram (11/30/2022): Echocardiogram was  Teddie Najjar called and faxed CT results for pt  Pt would like to be called and informed of the results. [phone]. negative for ischemia however ECG portion was positive for ischemic changes.    Coronary angiogram (12/12/2022): 30% proximal LAD, 40% mid LAD (lesion 1), 60% mid LAD (lesion 2) that was 30% stenosed into a sidebranch of the third diagonal.  The second LAD lesion was hemodynamically significant (iFR 0.89 and FFR 0.78) and was treated with a drug-eluting stent.  OM was 40% stenosed          Assessment and Plan:     ASSESSMENT:    1. Coronary artery disease    Status post drug-eluting stent to the LAD with mild nonobstructive disease elsewhere    DAPT with aspirin and Brilinta    Lipitor was increased from 10 mg daily to 40 mg daily    Last LDL 77 (11/23/2022)    2. Hypertension    Well controlled     Losartan 50 mg daily and metoprolol 25 mg twice daily    PLAN:     1. Please call if Brilinta is cost prohibitive as of Jan 1 and will transition to Plavix   2. Please call if concern with metoprolol as reports lower resting HR  3. Gradually restart exercise and start cardiac rehab  4. Continue all other medications without changes.  5. Please follow up with Dedra in 3 months.       Orders this Visit:  Orders Placed This Encounter   Procedures     Follow-Up with Cardiology KRISTEN     No orders of the defined types were placed in this encounter.    Medications Discontinued During This Encounter   Medication Reason     amoxicillin-clavulanate (AUGMENTIN) 875-125 MG per tablet Therapy completed     benzonatate (TESSALON) 200 MG capsule Stopped by Patient     fluticasone-salmeterol (ADVAIR) 250-50 MCG/DOSE diskus inhaler Stopped by Patient     predniSONE (DELTASONE) 20 MG tablet Stopped by Patient     albuterol (PROVENTIL HFA) 108 (90 BASE) MCG/ACT inhaler Medication Reconciliation Clean Up       Today's clinic visit entailed:  Review of the result(s) of each unique test - SE, cath  Prescription drug management  20 minutes spent on the date of the encounter doing chart review, history and exam, documentation and further  "activities per the note  Provider  Link to MDM Help Grid     The level of medical decision making during this visit was of moderate complexity.           Review of Systems:     Review of Systems:  Skin:  Negative     Eyes:  Negative    ENT:  Negative    Respiratory:  Positive for shortness of breath;cough;mucoid expectorant  Cardiovascular:  Negative    Gastroenterology: Negative    Genitourinary:  Negative    Musculoskeletal:  Negative    Neurologic:  Negative    Psychiatric:  Negative    Heme/Lymph/Imm:  Positive for allergies  Endocrine:  Negative              Physical Exam:     Vitals: /62   Pulse 56   Ht 1.715 m (5' 7.5\")   Wt 78.9 kg (174 lb)   BMI 26.85 kg/m    Constitutional: Well nourished and in no apparent distress.  Eyes: Pupils equal, round. Sclerae anicteric.   HEENT: Normocephalic, atraumatic.   Neck: Supple. JVD   Respiratory: Breathing non-labored. Lungs clear to auscultation bilaterally. No crackles, wheezes, rhonchi, or rales.  Cardiovascular:  Regular rate and rhythm, normal S1 and S2. No murmur, rub, or gallop.  Skin: Warm, dry. No rashes, cyanosis, or xanthelasma.  Extremities: RRA site with bruising and small hematoma  Neurologic: No gross motor deficits. Alert, awake, and oriented to person, place and time.  Psychiatric: Affect appropriate.             Medications:     Current Outpatient Medications   Medication Sig Dispense Refill     albuterol (PROVENTIL HFA: VENTOLIN HFA) 108 (90 BASE) MCG/ACT inhaler Inhale 2 puffs into the lungs every 6 hours as needed for shortness of breath / dyspnea. 1 Inhaler 6     albuterol (PROVENTIL) (2.5 MG/3ML) 0.083% neb solution Take 2 vials (5 mg) by nebulization every 4 hours as needed for shortness of breath / dyspnea or wheezing 1 Box 0     Aspirin 81 MG CAPS        atorvastatin (LIPITOR) 40 MG tablet Take 1 tablet (40 mg) by mouth daily 90 tablet 3     azelastine (ASTELIN) 137 MCG/SPRAY nasal spray Spray 2 sprays into both nostrils as needed " for rhinitis       famotidine (PEPCID) 20 MG tablet Take by mouth every 12 hours       FLUTICASONE PROPIONATE, NASAL, NA Spray in nostril 2 times daily       fluticasone-vilanterol (BREO ELLIPTA) 100-25 MCG/ACT inhaler Inhale 1 puff into the lungs daily as needed       losartan (COZAAR) 50 MG tablet Take 50 mg by mouth At Bedtime       metoprolol tartrate (LOPRESSOR) 25 MG tablet Take 1 tablet (25 mg) by mouth 2 times daily 180 tablet 3     niacin 500 MG tablet Take by mouth every other day       nitroGLYcerin (NITROSTAT) 0.4 MG sublingual tablet For chest pain place 1 tablet under the tongue every 5 minutes for 3 doses. If symptoms persist 5 minutes after 2nd dose call 911. 30 tablet 11     ticagrelor (BRILINTA) 90 MG tablet Take 1 tablet (90 mg) by mouth 2 times daily Start this evening. 180 tablet 3     triamcinolone (KENALOG) 0.1 % ointment APPLY SPARINGLY TO HANDS TWICE DAILY AS NEEDED TO CONTROL CONDITION. 30 g 0       Family History   Problem Relation Age of Onset     Hypertension Mother         alive age 73 arthritis     Heart Disease Father         alive age 73     Musculoskeletal Disorder Sister          age 42 ALS     Neurologic Disorder Sister         alive age 43 migranes     Family History Negative Sister         alive age 41     Breast Cancer Paternal Aunt              Breast Cancer Paternal Aunt              Breast Cancer Paternal Aunt              Diabetes Maternal Aunt          late 20's       Social History     Socioeconomic History     Marital status:      Spouse name: Jeannette     Number of children: 3     Years of education: Not on file     Highest education level: Not on file   Occupational History     Occupation: clinical research manager     Comment: advanced respiratory   Tobacco Use     Smoking status: Never     Smokeless tobacco: Never   Vaping Use     Vaping Use: Never used   Substance and Sexual Activity     Alcohol use: Yes     Comment:  occasional     Drug use: No     Sexual activity: Yes     Partners: Female   Other Topics Concern     Not on file   Social History Narrative     Not on file     Social Determinants of Health     Financial Resource Strain: Not on file   Food Insecurity: Not on file   Transportation Needs: Not on file   Physical Activity: Not on file   Stress: Not on file   Social Connections: Not on file   Intimate Partner Violence: Not on file   Housing Stability: Not on file            Past Medical History:     Past Medical History:   Diagnosis Date     Unspecified asthma(493.90) 1963              Past Surgical History:     Past Surgical History:   Procedure Laterality Date     CV CORONARY ANGIOGRAM N/A 12/12/2022    Procedure: Coronary Angiogram;  Surgeon: Xavier Alonso MD;  Location: [facility] CARDIAC CATH LAB     CV FRACTIONAL FLOW RATIO WIRE N/A 12/12/2022    Procedure: Fractional Flow Ratio Wire;  Surgeon: Xavier Alonso MD;  Location: [facility] CARDIAC CATH LAB     CV INSTANTANEOUS WAVE-FREE RATIO N/A 12/12/2022    Procedure: Instantaneous Wave-Free Ratio;  Surgeon: Xavier Alonso MD;  Location: [facility] CARDIAC CATH LAB     CV INTRAVASULAR ULTRASOUND N/A 12/12/2022    Procedure: Intravascular Ultrasound;  Surgeon: Xavier Alonso MD;  Location: [facility] CARDIAC CATH LAB     CV PCI STENT DRUG ELUTING N/A 12/12/2022    Procedure: Percutaneous Coronary Intervention Stent;  Surgeon: Xavier Alonso MD;  Location: [facility] CARDIAC CATH LAB     ZZC NONSPECIFIC PROCEDURE  1985    ORIF L middle finger              Allergies:   Erythromycin       Data:   All laboratory data reviewed:    Recent Labs   Lab Test 11/23/22  1000   TRIG 55       Lab Results   Component Value Date    WBC 7.0 12/12/2022    WBC 6.1 10/19/2020    RBC 4.55 12/12/2022    RBC 4.82 10/19/2020    HGB 14.1 12/12/2022    HGB 14.8 10/19/2020    HCT 41.4 12/12/2022    HCT 44.1 10/19/2020    MCV 91 12/12/2022    MCV 92 10/19/2020     MCH 31.0 12/12/2022    MCH 30.7 10/19/2020    MCHC 34.1 12/12/2022    MCHC 33.6 10/19/2020    RDW 13.3 12/12/2022    RDW 13.9 10/19/2020     12/12/2022     10/19/2020       Lab Results   Component Value Date     12/12/2022     10/19/2020    POTASSIUM 4.3 12/12/2022    POTASSIUM 3.6 10/19/2020    CHLORIDE 109 12/12/2022    CHLORIDE 102 11/23/2022    CHLORIDE 100 10/19/2020    CO2 27 12/12/2022    CO2 27 10/19/2020    ANIONGAP 5 12/12/2022    ANIONGAP 7 10/19/2020    GLC 99 12/12/2022    GLC 98 10/19/2020    BUN 15 12/12/2022    BUN 9 10/19/2020    CR 1.01 12/12/2022    CR 0.90 10/19/2020    GFRESTIMATED 83 12/12/2022    GFRESTIMATED >90 10/19/2020    GFRESTBLACK >90 10/19/2020    KAI 8.9 12/12/2022    KAI 8.7 10/19/2020      Lab Results   Component Value Date     (H) 10/19/2020     (H) 10/19/2020       No results found for: A1C    Lab Results   Component Value Date    INR 1.04 12/12/2022    INR 1.00 08/08/2005         JORGE MCALLISTER CNP  [facility]  Pager: [phone]  RN phone: [phone]

## 2022-12-29 ENCOUNTER — HOSPITAL ENCOUNTER (OUTPATIENT)
Dept: CARDIAC REHAB | Facility: CLINIC | Age: 62
Discharge: HOME | End: 2022-12-29
Attending: INTERNAL MEDICINE
Payer: COMMERCIAL

## 2022-12-29 DIAGNOSIS — I25.10 CAD (CORONARY ARTERY DISEASE): ICD-10-CM

## 2022-12-29 PROCEDURE — 93798 PHYS/QHP OP CAR RHAB W/ECG: CPT

## 2022-12-29 PROCEDURE — 93797 PHYS/QHP OP CAR RHAB WO ECG: CPT

## 2023-01-04 ENCOUNTER — HOSPITAL ENCOUNTER (OUTPATIENT)
Dept: CARDIAC REHAB | Facility: CLINIC | Age: 63
Discharge: HOME | End: 2023-01-04
Attending: INTERNAL MEDICINE
Payer: COMMERCIAL

## 2023-01-04 PROCEDURE — 93798 PHYS/QHP OP CAR RHAB W/ECG: CPT | Performed by: REHABILITATION PRACTITIONER

## 2023-01-10 ENCOUNTER — HOSPITAL ENCOUNTER (OUTPATIENT)
Dept: CARDIAC REHAB | Facility: CLINIC | Age: 63
Discharge: HOME | End: 2023-01-10
Attending: INTERNAL MEDICINE
Payer: COMMERCIAL

## 2023-01-10 PROCEDURE — 93798 PHYS/QHP OP CAR RHAB W/ECG: CPT | Performed by: REHABILITATION PRACTITIONER

## 2023-01-11 ENCOUNTER — VIRTUAL VISIT (OUTPATIENT)
Dept: CARDIOLOGY | Facility: CLINIC | Age: 63
End: 2023-01-11
Payer: COMMERCIAL

## 2023-01-11 DIAGNOSIS — Z00.6 EXAMINATION OF PARTICIPANT OR CONTROL IN CLINICAL RESEARCH: ICD-10-CM

## 2023-01-11 DIAGNOSIS — I25.10 CORONARY ARTERY DISEASE INVOLVING NATIVE CORONARY ARTERY OF NATIVE HEART WITHOUT ANGINA PECTORIS: Primary | ICD-10-CM

## 2023-01-12 NOTE — PROGRESS NOTES
Study follow up visits  [x] Month 1  [] Month 6 [] Month 12   [] Month 24    Timothy Balderas was called for [x] scheduled follow up visit OR for [] unscheduled follow up visit on 01/12/2023    Patient is doing well post angiogram. He has started cardiac rehab and has completed 3 sessions as of this visit. No AE's, CHEVY's or endpoints reported or noted since hospital discharge.    SAQ-7 & EQ-5D done per protocol   Answers reviewed for adverse events    CCS: Grade 1    Grade I  Ordinary physical activity does not cause angina, such as walking   and climbing stairs.  Angina with strenuous or rapid or prolonged  exertion at work or recreation   Grade II  Slight limitation of ordinary activity.  Walking or climbing stairs   rapidly, walking uphill, walking or stair climbing after meals, or in   cold, or in wind, or under emotional stress, or only during the few   hours after awakening.  Walking more than two blocks on the level   and climbing more than one flight of ordinary stairs at a normal   pace and in normal conditions   Grade III Marked limitation of ordinary physical activity.  Walking one or two   blocks on the level and climbing one flight of stairs in normal   conditions and at normal pace   Grade IV  Inability to carry on any physical activity without discomfort,   anginal syndrome may be present at rest       Reviewed  medications with participant-see list below   [x] No changes in anti-anginal medications   [] Anti-anginal medications changed    Reviewed with participant any other events since last visit   [] MI   [] Revascularization   [] Hospitalization for CV cause   [] hospitalized for non-CV cause    Reviewed Medical Resource Utilization   [] Hospital admission  If yes, how many    [] ED visit   If yes, how many    [x] Clinic visits   If yes, how many 1 and 3 rehab appts so far  Any OP diagnostic testing:   [] yes  [x] no   If yes, how many     Next study visit: M 6 scheduled for 6/12/23    Lorna  Josh RN     Current Outpatient Medications   Medication Instructions     albuterol (PROVENTIL HFA: VENTOLIN HFA) 108 (90 BASE) MCG/ACT inhaler 2 puffs, Inhalation, EVERY 6 HOURS PRN     albuterol (PROVENTIL) 5 mg, Nebulization, EVERY 4 HOURS PRN     Aspirin 81 MG CAPS Oral     atorvastatin (LIPITOR) 40 mg, Oral, DAILY     azelastine (ASTELIN) 137 MCG/SPRAY nasal spray 2 sprays, Both Nostrils, PRN     famotidine (PEPCID) 20 MG tablet Oral, EVERY 12 HOURS     FLUTICASONE PROPIONATE, NASAL, NA Nasal, 2 TIMES DAILY     fluticasone-vilanterol (BREO ELLIPTA) 100-25 MCG/ACT inhaler 1 puff, Inhalation, DAILY PRN     losartan (COZAAR) 50 mg, Oral, AT BEDTIME     metoprolol tartrate (LOPRESSOR) 25 mg, Oral, 2 TIMES DAILY     niacin 500 MG tablet Oral, EVERY OTHER DAY     nitroGLYcerin (NITROSTAT) 0.4 MG sublingual tablet For chest pain place 1 tablet under the tongue every 5 minutes for 3 doses. If symptoms persist 5 minutes after 2nd dose call 911.     ticagrelor (BRILINTA) 90 mg, Oral, 2 TIMES DAILY, Start this evening.     triamcinolone (KENALOG) 0.1 % ointment APPLY SPARINGLY TO HANDS TWICE DAILY AS NEEDED TO CONTROL CONDITION.

## 2023-01-13 ENCOUNTER — HOSPITAL ENCOUNTER (OUTPATIENT)
Dept: CARDIAC REHAB | Facility: CLINIC | Age: 63
Discharge: HOME | End: 2023-01-13
Attending: INTERNAL MEDICINE
Payer: COMMERCIAL

## 2023-01-13 PROCEDURE — 93798 PHYS/QHP OP CAR RHAB W/ECG: CPT

## 2023-01-17 ENCOUNTER — HOSPITAL ENCOUNTER (OUTPATIENT)
Dept: CARDIAC REHAB | Facility: CLINIC | Age: 63
Discharge: HOME | End: 2023-01-17
Attending: INTERNAL MEDICINE
Payer: COMMERCIAL

## 2023-01-17 PROCEDURE — 93798 PHYS/QHP OP CAR RHAB W/ECG: CPT | Performed by: REHABILITATION PRACTITIONER

## 2023-01-26 ENCOUNTER — TELEPHONE (OUTPATIENT)
Dept: CARDIOLOGY | Facility: CLINIC | Age: 63
End: 2023-01-26
Payer: COMMERCIAL

## 2023-01-26 DIAGNOSIS — I25.10 CORONARY ARTERY DISEASE INVOLVING NATIVE CORONARY ARTERY OF NATIVE HEART WITHOUT ANGINA PECTORIS: Primary | ICD-10-CM

## 2023-01-26 NOTE — TELEPHONE ENCOUNTER
Pt called wondering if he could be switched from Brilinta to something else due to cost. Per Dedra's OV note below pt to switch to Plavix.     2/23/22 OV with Dedra NP   PLAN:     1. Please call if Brilinta is cost prohibitive as of Jan 1 and will transition to Plavix       Will route to Dedra to see if she would like Plavix loading dose

## 2023-01-26 NOTE — TELEPHONE ENCOUNTER
[facility] Call Center    Phone Message    May a detailed message be left on voicemail: yes     Reason for Call: Other:     Pt is requesting an alternative to ticagrelor (BRILINTA) 90 MG tablet due to expense.  Send to [facility] [id number] [place] [address]    Action Taken: Other: cardio    Travel Screening: Not Applicable     Thank you!  [facility]

## 2023-01-27 RX ORDER — CLOPIDOGREL BISULFATE 75 MG/1
75 TABLET ORAL DAILY
Qty: 94 TABLET | Refills: 3 | Status: SHIPPED | OUTPATIENT
Start: 2023-01-27 | End: 2023-12-05

## 2023-01-27 NOTE — TELEPHONE ENCOUNTER
Yes, please transition to Plavix with a 300 mg loading dose x1 and 75 mg daily.    JORGE Pineda, CNP

## 2023-01-27 NOTE — TELEPHONE ENCOUNTER
RN called patient and reviewed with him KRISTEN Colmenares's recommendations. Patient verbalized understanding and is in agreement with plan. RN sent in new rx for plavix and updated patient's med list to reflect changes.

## 2023-01-31 ENCOUNTER — HOSPITAL ENCOUNTER (OUTPATIENT)
Dept: CARDIAC REHAB | Facility: CLINIC | Age: 63
Discharge: HOME | End: 2023-01-31
Attending: INTERNAL MEDICINE
Payer: COMMERCIAL

## 2023-01-31 PROCEDURE — 93798 PHYS/QHP OP CAR RHAB W/ECG: CPT

## 2023-02-03 ENCOUNTER — HOSPITAL ENCOUNTER (OUTPATIENT)
Dept: CARDIAC REHAB | Facility: CLINIC | Age: 63
Discharge: HOME | End: 2023-02-03
Attending: INTERNAL MEDICINE
Payer: COMMERCIAL

## 2023-02-03 PROCEDURE — 93798 PHYS/QHP OP CAR RHAB W/ECG: CPT | Performed by: REHABILITATION PRACTITIONER

## 2023-02-17 ENCOUNTER — HOSPITAL ENCOUNTER (OUTPATIENT)
Dept: CARDIAC REHAB | Facility: CLINIC | Age: 63
Discharge: HOME | End: 2023-02-17
Attending: INTERNAL MEDICINE
Payer: COMMERCIAL

## 2023-02-17 PROCEDURE — 93798 PHYS/QHP OP CAR RHAB W/ECG: CPT | Performed by: OCCUPATIONAL THERAPIST

## 2023-02-18 ENCOUNTER — APPOINTMENT (OUTPATIENT)
Dept: CT IMAGING | Facility: CLINIC | Age: 63
End: 2023-02-18
Attending: EMERGENCY MEDICINE
Payer: COMMERCIAL

## 2023-02-18 ENCOUNTER — HOSPITAL ENCOUNTER (OUTPATIENT)
Facility: CLINIC | Age: 63
Setting detail: OBSERVATION
Discharge: HOME | End: 2023-02-20
Attending: EMERGENCY MEDICINE | Admitting: HOSPITALIST
Payer: COMMERCIAL

## 2023-02-18 DIAGNOSIS — Z98.890 STATUS POST CORONARY ANGIOGRAM: Primary | ICD-10-CM

## 2023-02-18 DIAGNOSIS — T14.8XXA HEMATOMA OF MUSCLE: ICD-10-CM

## 2023-02-18 LAB
ABO/RH(D): NORMAL
ALBUMIN SERPL BCG-MCNC: 3.9 G/DL (ref 3.5–5.2)
ALP SERPL-CCNC: 56 U/L (ref 40–129)
ALT SERPL W P-5'-P-CCNC: 38 U/L (ref 10–50)
ANION GAP SERPL CALCULATED.3IONS-SCNC: 8 MMOL/L (ref 7–15)
ANTIBODY SCREEN: NEGATIVE
AST SERPL W P-5'-P-CCNC: 31 U/L (ref 10–50)
BASOPHILS # BLD AUTO: 0.1 10E3/UL (ref 0–0.2)
BASOPHILS NFR BLD AUTO: 0 %
BILIRUB SERPL-MCNC: 0.4 MG/DL
BUN SERPL-MCNC: 23.1 MG/DL (ref 8–23)
CALCIUM SERPL-MCNC: 9.3 MG/DL (ref 8.8–10.2)
CHLORIDE SERPL-SCNC: 100 MMOL/L (ref 98–107)
CREAT SERPL-MCNC: 1.24 MG/DL (ref 0.67–1.17)
DEPRECATED HCO3 PLAS-SCNC: 29 MMOL/L (ref 22–29)
EOSINOPHIL # BLD AUTO: 0.2 10E3/UL (ref 0–0.7)
EOSINOPHIL NFR BLD AUTO: 1 %
ERYTHROCYTE [DISTWIDTH] IN BLOOD BY AUTOMATED COUNT: 13.3 % (ref 10–15)
GFR SERPL CREATININE-BSD FRML MDRD: 65 ML/MIN/1.73M2
GLUCOSE BLDC GLUCOMTR-MCNC: 101 MG/DL (ref 70–99)
GLUCOSE SERPL-MCNC: 126 MG/DL (ref 70–99)
HCT VFR BLD AUTO: 37.7 % (ref 40–53)
HGB BLD-MCNC: 10.1 G/DL (ref 13.3–17.7)
HGB BLD-MCNC: 10.5 G/DL (ref 13.3–17.7)
HGB BLD-MCNC: 12.3 G/DL (ref 13.3–17.7)
HOLD SPECIMEN: NORMAL
IMM GRANULOCYTES # BLD: 0.1 10E3/UL
IMM GRANULOCYTES NFR BLD: 1 %
LYMPHOCYTES # BLD AUTO: 0.8 10E3/UL (ref 0.8–5.3)
LYMPHOCYTES NFR BLD AUTO: 5 %
MCH RBC QN AUTO: 30.8 PG (ref 26.5–33)
MCHC RBC AUTO-ENTMCNC: 32.6 G/DL (ref 31.5–36.5)
MCV RBC AUTO: 95 FL (ref 78–100)
MONOCYTES # BLD AUTO: 0.9 10E3/UL (ref 0–1.3)
MONOCYTES NFR BLD AUTO: 6 %
NEUTROPHILS # BLD AUTO: 13.1 10E3/UL (ref 1.6–8.3)
NEUTROPHILS NFR BLD AUTO: 87 %
NRBC # BLD AUTO: 0 10E3/UL
NRBC BLD AUTO-RTO: 0 /100
PLATELET # BLD AUTO: 369 10E3/UL (ref 150–450)
POTASSIUM SERPL-SCNC: 3.9 MMOL/L (ref 3.4–5.3)
PROT SERPL-MCNC: 6.4 G/DL (ref 6.4–8.3)
RBC # BLD AUTO: 3.99 10E6/UL (ref 4.4–5.9)
SODIUM SERPL-SCNC: 137 MMOL/L (ref 136–145)
SPECIMEN EXPIRATION DATE: NORMAL
WBC # BLD AUTO: 15.1 10E3/UL (ref 4–11)

## 2023-02-18 PROCEDURE — 86901 BLOOD TYPING SEROLOGIC RH(D): CPT | Performed by: EMERGENCY MEDICINE

## 2023-02-18 PROCEDURE — 36415 COLL VENOUS BLD VENIPUNCTURE: CPT | Performed by: EMERGENCY MEDICINE

## 2023-02-18 PROCEDURE — 82962 GLUCOSE BLOOD TEST: CPT

## 2023-02-18 PROCEDURE — 85025 COMPLETE CBC W/AUTO DIFF WBC: CPT | Performed by: EMERGENCY MEDICINE

## 2023-02-18 PROCEDURE — 80053 COMPREHEN METABOLIC PANEL: CPT | Performed by: EMERGENCY MEDICINE

## 2023-02-18 PROCEDURE — 85018 HEMOGLOBIN: CPT | Performed by: HOSPITALIST

## 2023-02-18 PROCEDURE — 99223 1ST HOSP IP/OBS HIGH 75: CPT | Mod: AI | Performed by: HOSPITALIST

## 2023-02-18 PROCEDURE — 36415 COLL VENOUS BLD VENIPUNCTURE: CPT | Performed by: HOSPITALIST

## 2023-02-18 PROCEDURE — 86850 RBC ANTIBODY SCREEN: CPT | Performed by: EMERGENCY MEDICINE

## 2023-02-18 PROCEDURE — 85018 HEMOGLOBIN: CPT | Mod: 91 | Performed by: EMERGENCY MEDICINE

## 2023-02-18 PROCEDURE — 250N000011 HC RX IP 250 OP 636: Performed by: EMERGENCY MEDICINE

## 2023-02-18 PROCEDURE — 73706 CT ANGIO LWR EXTR W/O&W/DYE: CPT | Mod: RT

## 2023-02-18 PROCEDURE — 96360 HYDRATION IV INFUSION INIT: CPT

## 2023-02-18 PROCEDURE — 250N000009 HC RX 250: Performed by: EMERGENCY MEDICINE

## 2023-02-18 PROCEDURE — 96361 HYDRATE IV INFUSION ADD-ON: CPT

## 2023-02-18 PROCEDURE — 72192 CT PELVIS W/O DYE: CPT

## 2023-02-18 PROCEDURE — 99285 EMERGENCY DEPT VISIT HI MDM: CPT | Mod: 25

## 2023-02-18 PROCEDURE — G0378 HOSPITAL OBSERVATION PER HR: HCPCS

## 2023-02-18 PROCEDURE — 258N000003 HC RX IP 258 OP 636: Performed by: EMERGENCY MEDICINE

## 2023-02-18 RX ORDER — HYDROMORPHONE HCL IN WATER/PF 6 MG/30 ML
0.4 PATIENT CONTROLLED ANALGESIA SYRINGE INTRAVENOUS
Status: DISCONTINUED | OUTPATIENT
Start: 2023-02-18 | End: 2023-02-19

## 2023-02-18 RX ORDER — NALOXONE HYDROCHLORIDE 0.4 MG/ML
0.4 INJECTION, SOLUTION INTRAMUSCULAR; INTRAVENOUS; SUBCUTANEOUS
Status: DISCONTINUED | OUTPATIENT
Start: 2023-02-18 | End: 2023-02-20 | Stop reason: HOSPADM

## 2023-02-18 RX ORDER — MORPHINE SULFATE 4 MG/ML
4 INJECTION, SOLUTION INTRAMUSCULAR; INTRAVENOUS
Status: DISCONTINUED | OUTPATIENT
Start: 2023-02-18 | End: 2023-02-19

## 2023-02-18 RX ORDER — NALOXONE HYDROCHLORIDE 0.4 MG/ML
0.2 INJECTION, SOLUTION INTRAMUSCULAR; INTRAVENOUS; SUBCUTANEOUS
Status: DISCONTINUED | OUTPATIENT
Start: 2023-02-18 | End: 2023-02-20 | Stop reason: HOSPADM

## 2023-02-18 RX ORDER — IOPAMIDOL 755 MG/ML
100 INJECTION, SOLUTION INTRAVASCULAR ONCE
Status: COMPLETED | OUTPATIENT
Start: 2023-02-18 | End: 2023-02-18

## 2023-02-18 RX ORDER — LOSARTAN POTASSIUM 50 MG/1
50 TABLET ORAL AT BEDTIME
Status: DISCONTINUED | OUTPATIENT
Start: 2023-02-19 | End: 2023-02-20 | Stop reason: HOSPADM

## 2023-02-18 RX ORDER — ACETAMINOPHEN 325 MG/1
975 TABLET ORAL EVERY 8 HOURS
Status: DISCONTINUED | OUTPATIENT
Start: 2023-02-19 | End: 2023-02-20 | Stop reason: HOSPADM

## 2023-02-18 RX ORDER — LIDOCAINE 40 MG/G
CREAM TOPICAL
Status: DISCONTINUED | OUTPATIENT
Start: 2023-02-18 | End: 2023-02-20 | Stop reason: HOSPADM

## 2023-02-18 RX ORDER — ASPIRIN 81 MG/1
81 TABLET ORAL DAILY
Status: DISCONTINUED | OUTPATIENT
Start: 2023-02-19 | End: 2023-02-19

## 2023-02-18 RX ORDER — METOPROLOL TARTRATE 25 MG/1
25 TABLET, FILM COATED ORAL 2 TIMES DAILY
Status: DISCONTINUED | OUTPATIENT
Start: 2023-02-19 | End: 2023-02-20 | Stop reason: HOSPADM

## 2023-02-18 RX ORDER — POLYETHYLENE GLYCOL 3350 17 G/17G
17 POWDER, FOR SOLUTION ORAL DAILY PRN
Status: DISCONTINUED | OUTPATIENT
Start: 2023-02-18 | End: 2023-02-20 | Stop reason: HOSPADM

## 2023-02-18 RX ORDER — AMOXICILLIN 250 MG
1 CAPSULE ORAL 2 TIMES DAILY PRN
Status: DISCONTINUED | OUTPATIENT
Start: 2023-02-18 | End: 2023-02-20 | Stop reason: HOSPADM

## 2023-02-18 RX ORDER — FLUTICASONE FUROATE AND VILANTEROL 100; 25 UG/1; UG/1
1 POWDER RESPIRATORY (INHALATION) DAILY PRN
Status: DISCONTINUED | OUTPATIENT
Start: 2023-02-18 | End: 2023-02-20 | Stop reason: HOSPADM

## 2023-02-18 RX ORDER — LANOLIN ALCOHOL/MO/W.PET/CERES
3 CREAM (GRAM) TOPICAL
Status: DISCONTINUED | OUTPATIENT
Start: 2023-02-18 | End: 2023-02-20 | Stop reason: HOSPADM

## 2023-02-18 RX ORDER — CLOPIDOGREL BISULFATE 75 MG/1
75 TABLET ORAL DAILY
Status: DISCONTINUED | OUTPATIENT
Start: 2023-02-19 | End: 2023-02-19

## 2023-02-18 RX ORDER — ONDANSETRON 2 MG/ML
4 INJECTION INTRAMUSCULAR; INTRAVENOUS EVERY 6 HOURS PRN
Status: DISCONTINUED | OUTPATIENT
Start: 2023-02-18 | End: 2023-02-20 | Stop reason: HOSPADM

## 2023-02-18 RX ORDER — AZELASTINE 1 MG/ML
1 SPRAY, METERED NASAL 2 TIMES DAILY
Status: DISCONTINUED | OUTPATIENT
Start: 2023-02-19 | End: 2023-02-20 | Stop reason: HOSPADM

## 2023-02-18 RX ORDER — ONDANSETRON 4 MG/1
4 TABLET, ORALLY DISINTEGRATING ORAL EVERY 6 HOURS PRN
Status: DISCONTINUED | OUTPATIENT
Start: 2023-02-18 | End: 2023-02-20 | Stop reason: HOSPADM

## 2023-02-18 RX ORDER — PROCHLORPERAZINE MALEATE 10 MG
10 TABLET ORAL EVERY 6 HOURS PRN
Status: DISCONTINUED | OUTPATIENT
Start: 2023-02-18 | End: 2023-02-20 | Stop reason: HOSPADM

## 2023-02-18 RX ORDER — SODIUM CHLORIDE, SODIUM LACTATE, POTASSIUM CHLORIDE, CALCIUM CHLORIDE 600; 310; 30; 20 MG/100ML; MG/100ML; MG/100ML; MG/100ML
INJECTION, SOLUTION INTRAVENOUS CONTINUOUS
Status: ACTIVE | OUTPATIENT
Start: 2023-02-19 | End: 2023-02-19

## 2023-02-18 RX ORDER — OXYCODONE HYDROCHLORIDE 5 MG/1
5 TABLET ORAL EVERY 4 HOURS PRN
Status: DISCONTINUED | OUTPATIENT
Start: 2023-02-18 | End: 2023-02-20 | Stop reason: HOSPADM

## 2023-02-18 RX ORDER — AMOXICILLIN 250 MG
2 CAPSULE ORAL 2 TIMES DAILY PRN
Status: DISCONTINUED | OUTPATIENT
Start: 2023-02-18 | End: 2023-02-20 | Stop reason: HOSPADM

## 2023-02-18 RX ORDER — FENTANYL CITRATE 50 UG/ML
100 INJECTION, SOLUTION INTRAMUSCULAR; INTRAVENOUS ONCE
Status: COMPLETED | OUTPATIENT
Start: 2023-02-18 | End: 2023-02-18

## 2023-02-18 RX ORDER — HYDROMORPHONE HCL IN WATER/PF 6 MG/30 ML
0.2 PATIENT CONTROLLED ANALGESIA SYRINGE INTRAVENOUS
Status: DISCONTINUED | OUTPATIENT
Start: 2023-02-18 | End: 2023-02-19

## 2023-02-18 RX ORDER — NITROGLYCERIN 0.4 MG/1
0.4 TABLET SUBLINGUAL EVERY 5 MIN PRN
Status: DISCONTINUED | OUTPATIENT
Start: 2023-02-18 | End: 2023-02-20 | Stop reason: HOSPADM

## 2023-02-18 RX ORDER — PROCHLORPERAZINE 25 MG
25 SUPPOSITORY, RECTAL RECTAL EVERY 12 HOURS PRN
Status: DISCONTINUED | OUTPATIENT
Start: 2023-02-18 | End: 2023-02-20 | Stop reason: HOSPADM

## 2023-02-18 RX ORDER — ATORVASTATIN CALCIUM 40 MG/1
40 TABLET, FILM COATED ORAL DAILY
Status: DISCONTINUED | OUTPATIENT
Start: 2023-02-19 | End: 2023-02-20 | Stop reason: HOSPADM

## 2023-02-18 RX ORDER — FAMOTIDINE 20 MG/1
20 TABLET, FILM COATED ORAL 2 TIMES DAILY
Status: DISCONTINUED | OUTPATIENT
Start: 2023-02-19 | End: 2023-02-20 | Stop reason: HOSPADM

## 2023-02-18 RX ADMIN — SODIUM CHLORIDE 1000 ML: 9 INJECTION, SOLUTION INTRAVENOUS at 18:15

## 2023-02-18 RX ADMIN — SODIUM CHLORIDE 70 ML: 900 INJECTION INTRAVENOUS at 18:33

## 2023-02-18 RX ADMIN — SODIUM CHLORIDE 1000 ML: 9 INJECTION, SOLUTION INTRAVENOUS at 20:36

## 2023-02-18 RX ADMIN — IOPAMIDOL 100 ML: 755 INJECTION, SOLUTION INTRAVENOUS at 18:32

## 2023-02-18 ASSESSMENT — ACTIVITIES OF DAILY LIVING (ADL)
ADLS_ACUITY_SCORE: 35

## 2023-02-18 ASSESSMENT — ENCOUNTER SYMPTOMS: ARTHRALGIAS: 1

## 2023-02-18 NOTE — ED NOTES
Bed: ED07  Expected date:   Expected time:   Means of arrival:   Comments:  511  61 M hip injury from a fall/on thinners  1721

## 2023-02-19 LAB
ANION GAP SERPL CALCULATED.3IONS-SCNC: 6 MMOL/L (ref 7–15)
BUN SERPL-MCNC: 14 MG/DL (ref 8–23)
CALCIUM SERPL-MCNC: 8.4 MG/DL (ref 8.8–10.2)
CHLORIDE SERPL-SCNC: 108 MMOL/L (ref 98–107)
CREAT SERPL-MCNC: 0.89 MG/DL (ref 0.67–1.17)
DEPRECATED HCO3 PLAS-SCNC: 25 MMOL/L (ref 22–29)
ERYTHROCYTE [DISTWIDTH] IN BLOOD BY AUTOMATED COUNT: 13.7 % (ref 10–15)
GFR SERPL CREATININE-BSD FRML MDRD: >90 ML/MIN/1.73M2
GLUCOSE SERPL-MCNC: 102 MG/DL (ref 70–99)
HCT VFR BLD AUTO: 28.8 % (ref 40–53)
HGB BLD-MCNC: 8.6 G/DL (ref 13.3–17.7)
HGB BLD-MCNC: 9.3 G/DL (ref 13.3–17.7)
HGB BLD-MCNC: 9.7 G/DL (ref 13.3–17.7)
MAGNESIUM SERPL-MCNC: 2 MG/DL (ref 1.7–2.3)
MCH RBC QN AUTO: 31.3 PG (ref 26.5–33)
MCHC RBC AUTO-ENTMCNC: 33.7 G/DL (ref 31.5–36.5)
MCV RBC AUTO: 93 FL (ref 78–100)
PHOSPHATE SERPL-MCNC: 2.9 MG/DL (ref 2.5–4.5)
PLATELET # BLD AUTO: 295 10E3/UL (ref 150–450)
POTASSIUM SERPL-SCNC: 4 MMOL/L (ref 3.4–5.3)
RBC # BLD AUTO: 3.1 10E6/UL (ref 4.4–5.9)
SODIUM SERPL-SCNC: 139 MMOL/L (ref 136–145)
WBC # BLD AUTO: 6.7 10E3/UL (ref 4–11)

## 2023-02-19 PROCEDURE — 36415 COLL VENOUS BLD VENIPUNCTURE: CPT | Performed by: INTERNAL MEDICINE

## 2023-02-19 PROCEDURE — 84100 ASSAY OF PHOSPHORUS: CPT | Performed by: HOSPITALIST

## 2023-02-19 PROCEDURE — 250N000013 HC RX MED GY IP 250 OP 250 PS 637: Performed by: INTERNAL MEDICINE

## 2023-02-19 PROCEDURE — 250N000013 HC RX MED GY IP 250 OP 250 PS 637: Performed by: HOSPITALIST

## 2023-02-19 PROCEDURE — 83735 ASSAY OF MAGNESIUM: CPT | Performed by: HOSPITALIST

## 2023-02-19 PROCEDURE — 99222 1ST HOSP IP/OBS MODERATE 55: CPT | Performed by: INTERNAL MEDICINE

## 2023-02-19 PROCEDURE — 36415 COLL VENOUS BLD VENIPUNCTURE: CPT | Performed by: HOSPITALIST

## 2023-02-19 PROCEDURE — 99232 SBSQ HOSP IP/OBS MODERATE 35: CPT | Performed by: INTERNAL MEDICINE

## 2023-02-19 PROCEDURE — 80048 BASIC METABOLIC PNL TOTAL CA: CPT | Performed by: HOSPITALIST

## 2023-02-19 PROCEDURE — 258N000003 HC RX IP 258 OP 636: Performed by: HOSPITALIST

## 2023-02-19 PROCEDURE — 85018 HEMOGLOBIN: CPT | Performed by: HOSPITALIST

## 2023-02-19 PROCEDURE — G0378 HOSPITAL OBSERVATION PER HR: HCPCS

## 2023-02-19 PROCEDURE — 85018 HEMOGLOBIN: CPT | Mod: 91 | Performed by: INTERNAL MEDICINE

## 2023-02-19 PROCEDURE — 96361 HYDRATE IV INFUSION ADD-ON: CPT

## 2023-02-19 RX ORDER — ASPIRIN 81 MG/1
81 TABLET ORAL DAILY
Status: DISCONTINUED | OUTPATIENT
Start: 2023-02-19 | End: 2023-02-20 | Stop reason: HOSPADM

## 2023-02-19 RX ORDER — CLOPIDOGREL BISULFATE 75 MG/1
75 TABLET ORAL DAILY
Status: DISCONTINUED | OUTPATIENT
Start: 2023-02-19 | End: 2023-02-20 | Stop reason: HOSPADM

## 2023-02-19 RX ORDER — FLUTICASONE PROPIONATE 50 MCG
1 SPRAY, SUSPENSION (ML) NASAL DAILY
Status: DISCONTINUED | OUTPATIENT
Start: 2023-02-20 | End: 2023-02-20 | Stop reason: HOSPADM

## 2023-02-19 RX ADMIN — CLOPIDOGREL BISULFATE 75 MG: 75 TABLET ORAL at 14:47

## 2023-02-19 RX ADMIN — ASPIRIN 81 MG: 81 TABLET, COATED ORAL at 14:47

## 2023-02-19 RX ADMIN — FAMOTIDINE 20 MG: 20 TABLET, FILM COATED ORAL at 00:04

## 2023-02-19 RX ADMIN — FAMOTIDINE 20 MG: 20 TABLET, FILM COATED ORAL at 20:48

## 2023-02-19 RX ADMIN — ACETAMINOPHEN 975 MG: 325 TABLET, FILM COATED ORAL at 00:04

## 2023-02-19 RX ADMIN — SODIUM CHLORIDE, POTASSIUM CHLORIDE, SODIUM LACTATE AND CALCIUM CHLORIDE: 600; 310; 30; 20 INJECTION, SOLUTION INTRAVENOUS at 10:07

## 2023-02-19 RX ADMIN — AZELASTINE HYDROCHLORIDE 1 SPRAY: 137 SPRAY, METERED NASAL at 08:28

## 2023-02-19 RX ADMIN — ACETAMINOPHEN 975 MG: 325 TABLET, FILM COATED ORAL at 15:39

## 2023-02-19 RX ADMIN — ATORVASTATIN CALCIUM 40 MG: 40 TABLET, FILM COATED ORAL at 08:26

## 2023-02-19 RX ADMIN — ACETAMINOPHEN 975 MG: 325 TABLET, FILM COATED ORAL at 08:27

## 2023-02-19 RX ADMIN — AZELASTINE HYDROCHLORIDE 1 SPRAY: 137 SPRAY, METERED NASAL at 20:48

## 2023-02-19 RX ADMIN — SODIUM CHLORIDE, POTASSIUM CHLORIDE, SODIUM LACTATE AND CALCIUM CHLORIDE: 600; 310; 30; 20 INJECTION, SOLUTION INTRAVENOUS at 00:05

## 2023-02-19 RX ADMIN — FAMOTIDINE 20 MG: 20 TABLET, FILM COATED ORAL at 08:27

## 2023-02-19 ASSESSMENT — ACTIVITIES OF DAILY LIVING (ADL)
ADLS_ACUITY_SCORE: 22
ADLS_ACUITY_SCORE: 35
ADLS_ACUITY_SCORE: 22

## 2023-02-19 NOTE — ED PROVIDER NOTES
History     Chief Complaint:  Hip Pain     HPI   Timothy Balderas is a 62 year old male, on Plavix, with a history of coronary artery disease and atrial fibrillation who presents via EMS after a fall occurring while skiing wherein the patient fell on his right hip. The patient is on Plavix for a recent stent placement. Denies hitting head, and he reports he had a helmet on. Denies loss of consciousness.    Independent Historian:   None - Patient Only    ROS:  Review of Systems   Musculoskeletal: Positive for arthralgias (right hip).   Neurological: Negative for syncope.   All other systems reviewed and are negative.    Allergies:  Erythromycin     Medications:    Albuterol  Aspirin  Atorvastatin  Azelastine  Clopidogrel  Famotidine  Fluticasone  Losartan  Metoprolol tartrate  Niacin  Nitroglycerin    Past Medical History:    Asthma  CAD  Allergic rhinitis  Atrial fibrillation    Past Surgical History:    Coronary angiogram  Fractional flow ratio wire  Instantaneous wave-free ratio  Intravascular ultrasound  Percutaneous coronary intervention stent  ORIF L middle finger     Family History:    Heart disease  Hypertension  Musculoskeletal disorder  Neurologic disorder     Social History:  Reports that he has never smoked. He has never used smokeless tobacco. He reports current alcohol use. He reports that he does not use drugs.  PCP: Narendra Watkins     Physical Exam     Patient Vitals for the past 24 hrs:   BP Temp Temp src Pulse Resp SpO2 Height Weight   02/18/23 2130 106/58 -- -- 75 (!) 9 97 % -- --   02/18/23 2110 112/63 -- -- 74 (!) 9 99 % -- --   02/18/23 2109 -- -- -- 77 14 99 % -- --   02/18/23 2108 -- -- -- 73 12 99 % -- --   02/18/23 2101 114/51 -- -- 75 (!) 9 99 % -- --   02/18/23 2100 110/54 -- -- 77 19 99 % -- --   02/18/23 2052 115/54 -- -- 76 -- -- -- --   02/18/23 2051 -- -- -- -- -- 96 % -- --   02/18/23 2045 105/54 -- -- 54 -- 99 % -- --   02/18/23 2040 -- -- -- -- -- 96 % -- --   02/18/23 2039 (!)  "72/39 -- -- 56 -- 91 % -- --   02/18/23 2030 (!) 70/43 -- -- 75 -- 98 % -- --   02/18/23 2028 (!) 82/46 -- -- 70 -- 97 % -- --   02/18/23 2025 (!) 78/45 -- -- 69 -- 99 % -- --   02/18/23 2015 92/47 -- -- 70 -- 98 % -- --   02/18/23 2000 104/57 -- -- 76 -- 97 % -- --   02/18/23 1945 110/60 -- -- 72 -- 97 % -- --   02/18/23 1930 110/58 -- -- 71 -- 99 % -- --   02/18/23 1915 112/62 -- -- 64 -- 100 % -- --   02/18/23 1900 132/61 -- -- 59 -- 100 % -- --   02/18/23 1845 114/72 -- -- 62 -- 100 % -- --   02/18/23 1830 125/56 -- -- -- 14 100 % -- --   02/18/23 1800 108/77 -- -- 54 12 100 % -- --   02/18/23 1748 123/76 98.2  F (36.8  C) Oral 50 14 99 % 1.702 m (5' 7\") 78.5 kg (173 lb)        Physical Exam  Constitutional:       General: He is in acute distress.      Appearance: He is not diaphoretic.   HENT:      Head: Atraumatic.   Eyes:      General: No scleral icterus.     Pupils: Pupils are equal, round, and reactive to light.   Neck:      Comments: No C-spine tenderness or step-off.  Full range of motion without pain.  Cardiovascular:      Rate and Rhythm: Normal rate and regular rhythm.      Heart sounds: Normal heart sounds.      Comments: No chest wall tenderness or crepitance.  Pulmonary:      Effort: No respiratory distress.      Breath sounds: Normal breath sounds.   Abdominal:      Palpations: Abdomen is soft.      Tenderness: There is no abdominal tenderness.      Comments: No abdominal distention, ecchymosis, or tenderness.   Musculoskeletal:      Comments: No thoracic or lumbar tenderness.  No pelvic instability.  No tenderness of the right groin.  Over the lateral right hip and proximal thigh there is a large area of ecchymosis that is firm and tender to palpation.  However, the compartments of the thigh are soft and supple.  Perfusion distally of the foot and lower leg is normal.  Neurovascular exam is normal.   Skin:     General: Skin is warm.      Capillary Refill: Capillary refill takes less than 2 " seconds.      Findings: No rash.   Neurological:      General: No focal deficit present.      Mental Status: He is oriented to person, place, and time.   Psychiatric:         Mood and Affect: Mood normal.         Behavior: Behavior normal.           Emergency Department Course     Imaging:  CTA Lower Extremity Right with Contrast   Final Result   IMPRESSION:   1.  Large approximate 15 x 6 x 10 cm subcutaneous hematoma lateral to the right hip (Mccall-Tim type lesion) without evidence for active bleeding.      CT Pelvis Bone wo Contrast   Final Result   IMPRESSION:   1.  Large hematoma within the subcutaneous soft tissues along the superficial myofascial margin of the lower right gluteal musculature and upper right quadriceps musculature suggestive of internal degloving injury in the setting of a Mccall-Tim type    lesion. The hematoma measures 9.1 x 6.8 x 13.7 cm with surrounding information and/or edema.   2.  Both hips negative for fracture or CT evidence of avascular necrosis. There is mild degenerative change. No significant effusion.   3.  Pelvis negative for fracture. Additional degenerative change at the SI joints.   4.  Advanced degenerative change within the visualized portion of the lower lumbar spine.   5.  Intrapelvic contents negative.         Report per radiology    Laboratory:  Labs Ordered and Resulted from Time of ED Arrival to Time of ED Departure   COMPREHENSIVE METABOLIC PANEL - Abnormal       Result Value    Sodium 137      Potassium 3.9      Chloride 100      Carbon Dioxide (CO2) 29      Anion Gap 8      Urea Nitrogen 23.1 (*)     Creatinine 1.24 (*)     Calcium 9.3      Glucose 126 (*)     Alkaline Phosphatase 56      AST 31      ALT 38      Protein Total 6.4      Albumin 3.9      Bilirubin Total 0.4      GFR Estimate 65     CBC WITH PLATELETS AND DIFFERENTIAL - Abnormal    WBC Count 15.1 (*)     RBC Count 3.99 (*)     Hemoglobin 12.3 (*)     Hematocrit 37.7 (*)     MCV 95      MCH  30.8      MCHC 32.6      RDW 13.3      Platelet Count 369      % Neutrophils 87      % Lymphocytes 5      % Monocytes 6      % Eosinophils 1      % Basophils 0      % Immature Granulocytes 1      NRBCs per 100 WBC 0      Absolute Neutrophils 13.1 (*)     Absolute Lymphocytes 0.8      Absolute Monocytes 0.9      Absolute Eosinophils 0.2      Absolute Basophils 0.1      Absolute Immature Granulocytes 0.1      Absolute NRBCs 0.0     HEMOGLOBIN - Abnormal    Hemoglobin 10.5 (*)    TYPE AND SCREEN, ADULT    ABO/RH(D) AB POS      Antibody Screen Negative      SPECIMEN EXPIRATION DATE [id number]     ABO/RH TYPE AND SCREEN      Emergency Department Course & Assessments:       Interventions:  Medications   morphine (PF) injection 4 mg (has no administration in time range)   0.9% sodium chloride BOLUS (0 mLs Intravenous Stopped 2/18/23 1944)   Saline (70 mLs As instructed Given 2/18/23 1833)   iopamidol (ISOVUE-370) solution 100 mL (100 mLs Intravenous Given 2/18/23 1832)   fentaNYL (PF) (SUBLIMAZE) injection 100 mcg (100 mcg Intravenous Not Given 2/18/23 2039)   0.9% sodium chloride BOLUS (0 mLs Intravenous Stopped 2/18/23 2109)        Independent Interpretation (X-rays, CTs, rhythm strip):  CT images independently reviewed.  No clear fracture.    Consultations/Discussion of Management or Tests:  1950    I consulted with Dr. Olvera, orthopedics, regarding the patient's history and presentation here in the emergency department.  2010 I consulted with Dr. Weldon, hospitalist, regarding the patient's history and presentation here in the emergency department who accepted the patient for admission.        Assessments:  1801 I entered the patient's room and obtained history.  2041 I rechecked the patient.    Disposition:  The patient was admitted to the hospital under the care of Dr. Weldon.     Impression & Plan      Medical Decision Making:  This patient is a 62-year-old man who presents to the emergency department for  evaluation of a skiing injury.  He complains of pain slowly over the right lateral hip and thigh.  The remainder of his trauma exam on arrival does not demonstrate any evidence of injury to the cervical, thoracic, or lumbar spine.  No chest tenderness whatsoever.  No abdominal tenderness or ecchymosis at all.  He has a large hematoma of the lateral right hip and thigh.  Distal neurovascular exam is normal.    CT of the pelvis does not demonstrate any fracture.  CT with contrast of the right femur does demonstrate a very large hematoma with potentially degloving injury of the fascia.  There does not appear to be any active extravasation.  I spoke with on-call orthopedics.  This does not warrant an immediate intervention.  However, the patient will be admitted and watched very closely for compartment syndrome.  There is concern for infection moving forward although at this point there are no signs to indicate infection.    The patient did have a brief hypotensive episode.  I went back and reevaluated him.  He still has no tenderness whatsoever the chest or abdomen.  The hematoma has not appreciably increased in size.  Distal neurovascular exam is normal.  This may be related to pain and vagal response.  He improved well with IV fluids.  Repeat hemoglobin has dropped by less than 2 g/dL and this is somewhat delusional as he was given IV fluids in the interim.    The patient will be admitted and watched very closely.    Critical Care time was 35 minutes for this patient excluding procedures.    Diagnosis:    ICD-10-CM    1. Hematoma of muscle  T14.8XXA            Scribe Disclosure:  Rylan LOAIZA Hired, am serving as a scribe at 8:27 PM on 2/18/2023 to document services personally performed by Emerson Lam MD, based on my observations and the provider's statements to me.     2/18/2023   Emerson Lam MD McRoberts, Sean Edward, MD  02/18/23 8996

## 2023-02-19 NOTE — H&P
[facility]    History and Physical - Hospitalist Service       Date of Admission:  2/18/2023    Assessment & Plan      Timothy Baldears is a 62 year old male admitted on 2/18/2023 with right hip pain after a fall during skiing. Found to have a large hematoma on right lateral hip. Complicated by episode of hypotension, responsive to IVF.    Fall while skiing  Large hematoma right lateral hip ([id number]), Mccall- Tim type lesion  Acute blood loss anemia  * CT pelvis bone w/o contrast: large hematoma along lower right gluteal musculature and upper right quad. No fracture or avascular necrosis in either hip. Pelvis negative for fracture  * CTA: no evidence active bleeding  * Hgb 2mo ago was 14.1, down to 12.3 on ED presentation, then 10.5-->10.1  - IMC monitoring  - orthopedic surgery consulted, ED discussed with Dr Drake- plan close monitoring q2h overnight to watch for compartment syndrome  - PRN oxycodone, IV dilaudid for pain  - scheduled tylenol  - blood consented in ED, conditional PRBC orders for hgb <7  - hgb at 230am and 6am  - IVF  - clears, NPO at 5am in case intervention by ortho    CAD s/p PCI to prox-mid LAD with JOHN on 12/12/22  Hypertension  Hyperlipidemia  PTA ASA 81mg, lipitor 40mg, plavix 75mg, losartan 50mg and lopressor 25mg BID  - hold PTA medications for now given hypotension and bleed  - cardiology consulted for input regarding how long DAPT can safely be held  - patient reports HRs in 40s and SBP 100s at home since starting lopressor and ++fatigue, hoping to decrease some of his antihypertensives    Mildly elevated Cr  Baseline near 1. Up to 1.24 on admit  - BMP in AM    Leukocytosis  WBC 15.1, secondary to stress demargination  Monitor with CBC    GERD   Pepcid BID    Allergies  Asthma  - PTA astelin and breo continued     Diet:  clear liquids, NPO at 5am  DVT Prophylaxis: Pneumatic Compression Devices  Garcia Catheter: Not present  Lines: None     Cardiac Monitoring:  "None  Code Status:   full code    Clinically Significant Risk Factors Present on Admission                # Drug Induced Platelet Defect: home medication list includes an antiplatelet medication   # Hypertension: home medication list includes antihypertensive(s)      # Overweight: Estimated body mass index is 27.1 kg/m  as calculated from the following:    Height as of this encounter: 1.702 m (5' 7\").    Weight as of this encounter: 78.5 kg (173 lb).           Disposition Plan      Expected Discharge Date: 02/19/2023                  Paige Weldon DO  Hospitalist Service  [facility]  Securely message with [employer] (more info)  Text page via [facility] Paging/Directory     ______________________________________________________________________    Chief Complaint   Hip pain    History is obtained from the patient, prior record review    History of Present Illness   Timothy Balderas is a 62 year old male who presents after a fall while skiing. He fell onto his right side, tumbled. Was able to get up and ski down the hill. He then was able to do a few more runs to get back to the [facility]. He had no pain at that point. [facility], he realized he was having a lot of pain and swelling in his right hip and came to the ED for eval. He was noted to have a large hematoma, but was otherwise stable. While in the ED around 830pm, he felt lightheaded with blurry vision, received IVF and repeat hemoglobin was decreased by 2g. He then stabilized after additional bolus of fluid. He denies chest pain, shortness of breath, abdominal pain, nausea, vomiting, fevers, chills. Discussed blood count with patient, ongoing monitoring and plan for blood transfusion if needed. Discussed DAPT as well. He reports concerns about fatigue after being on metoprolol/losartan after his recent stenting and is due for cardiology follow-up.      Past Medical History    Past Medical History:   Diagnosis Date     " Unspecified asthma(493.90) 1965   CAD  Hypertension  Hyperlipidemia    Past Surgical History   Past Surgical History:   Procedure Laterality Date     CV CORONARY ANGIOGRAM N/A 12/12/2022    Procedure: Coronary Angiogram;  Surgeon: Xavier Alonso MD;  Location: [facility] CARDIAC CATH LAB     CV FRACTIONAL FLOW RATIO WIRE N/A 12/12/2022    Procedure: Fractional Flow Ratio Wire;  Surgeon: Xavier Alonso MD;  Location: [facility] CARDIAC CATH LAB     CV INSTANTANEOUS WAVE-FREE RATIO N/A 12/12/2022    Procedure: Instantaneous Wave-Free Ratio;  Surgeon: Xavier Alonso MD;  Location: [facility] CARDIAC CATH LAB     CV INTRAVASULAR ULTRASOUND N/A 12/12/2022    Procedure: Intravascular Ultrasound;  Surgeon: Xavier Alonso MD;  Location: [facility] CARDIAC CATH LAB     CV PCI STENT DRUG ELUTING N/A 12/12/2022    Procedure: Percutaneous Coronary Intervention Stent;  Surgeon: Xavier Alonso MD;  Location: [facility] CARDIAC CATH LAB     ZZC NONSPECIFIC PROCEDURE  1985    ORIF L middle finger       Prior to Admission Medications   Prior to Admission Medications   Prescriptions Last Dose Informant Patient Reported? Taking?   FLUTICASONE PROPIONATE, NASAL, NA 2/18/2023 at am  Yes Yes   Sig: Instill 1 spray in both nostrils twice daily   albuterol (PROVENTIL HFA: VENTOLIN HFA) 108 (90 BASE) MCG/ACT inhaler  at prn  No Yes   Sig: Inhale 2 puffs into the lungs every 6 hours as needed for shortness of breath / dyspnea.   aspirin (ASA) 81 MG EC tablet 2/18/2023  Yes Yes   Sig: Take 1 tablet by mouth daily   atorvastatin (LIPITOR) 40 MG tablet 2/17/2023 at pm  No Yes   Sig: Take 1 tablet (40 mg) by mouth daily   azelastine (ASTELIN) 137 MCG/SPRAY nasal spray 2/18/2023 at am  Yes Yes   Sig: Spray 1 spray into both nostrils 2 times daily   clopidogrel (PLAVIX) 75 MG tablet 2/18/2023 at 0700  No Yes   Sig: Take 1 tablet (75 mg) by mouth daily Take 300mg (four 75mg tablets) day 1 then 75mg everyday  thereafter.   famotidine (PEPCID) 20 MG tablet 2/18/2023 at am  Yes Yes   Sig: Take 20 mg by mouth 2 times daily   fluticasone-vilanterol (BREO ELLIPTA) 100-25 MCG/ACT inhaler  at prn  Yes Yes   Sig: Inhale 1 puff into the lungs daily as needed   losartan (COZAAR) 50 MG tablet 2/17/2023 at pm  Yes Yes   Sig: Take 50 mg by mouth At Bedtime   metoprolol tartrate (LOPRESSOR) 25 MG tablet 2/18/2023  No Yes   Sig: Take 1 tablet (25 mg) by mouth 2 times daily   niacin 500 MG tablet   Yes No   Sig: Take 500 mg by mouth daily   nitroGLYcerin (NITROSTAT) 0.4 MG sublingual tablet  at prn- has never used  No Yes   Sig: For chest pain place 1 tablet under the tongue every 5 minutes for 3 doses. If symptoms persist 5 minutes after 2nd dose call 911.   triamcinolone (KENALOG) 0.1 % ointment  at prn  No Yes   Sig: APPLY SPARINGLY TO HANDS TWICE DAILY AS NEEDED TO CONTROL CONDITION.      Facility-Administered Medications: None        Physical Exam   Vital Signs: Temp: 98.2  F (36.8  C) Temp src: Oral BP: 102/54 Pulse: 83   Resp: 11 SpO2: 98 % O2 Device: None (Room air)    Weight: 173 lbs 0 oz    Constitutional: Awake, alert, cooperative, no apparent distress.  Eyes: Conjunctiva and pupils examined and normal.  HEENT: Moist mucous membranes, normal dentition.  Respiratory: Clear to auscultation bilaterally, no crackles or wheezing.  Cardiovascular: Regular rate and rhythm, normal S1 and S2, and no murmur noted.  GI: Soft, non-distended, non-tender, normal bowel sounds.  Lymph/Hematologic: No anterior cervical or supraclavicular adenopathy.  Skin: No rashes, no cyanosis, no edema.   Musculoskeletal: right hip with large hematoma, beginnings of ecchymosis. From upper thigh to just above knee and extending from mid anterior thigh to most lateral posterior thigh. Hematoma is firm, otherwise thigh compartments are soft.  Neurologic: Cranial nerves 2-12 intact, normal strength and sensation.  Psychiatric: Alert, oriented to person, place  and time, no obvious anxiety or depression.    Medical Decision Making       75 MINUTES SPENT BY ME on the date of service doing chart review, history, exam, documentation & further activities per the note.      Data     I have personally reviewed the following data over the past 24 hrs:    15.1 (H)  \   10.1 (L)   / 369     137 100 23.1 (H) /  126 (H)   3.9 29 1.24 (H) \       ALT: 38 AST: 31 AP: 56 TBILI: 0.4   ALB: 3.9 TOT PROTEIN: 6.4 LIPASE: N/A       Imaging results reviewed over the past 24 hrs:   Recent Results (from the past 24 hour(s))   CT Pelvis Bone wo Contrast    Narrative    EXAM: CT PELVIS BONE WO CONTRAST  LOCATION: [facility]  DATE/TIME: 2/18/2023 6:47 PM    INDICATION: Trauma, pain  COMPARISON: None.  TECHNIQUE: CT scan of the pelvis was performed without IV contrast. Multiplanar reformats were obtained. Dose reduction techniques were used.  CONTRAST: None.    FINDINGS:    Both hips are negative for fracture or CT evidence of avascular necrosis. There is mild degenerative change of both hip joints. Findings are fairly symmetric.    The remainder of the bony pelvis is negative for acute fracture. There is degenerative change at the SI joints bilaterally.    Degenerative change within the visualized portion of the lower lumbar spine.    The intrapelvic contents are negative for abnormal mass, free fluid, or lymphadenopathy.    There is a large hematoma within the subcutaneous soft tissues along the superficial margin of the right gluteal musculature and upper aspect of the quadriceps musculature. Findings are suggestive of internal degloving Mccall-Tim type lesion. The   hematoma measures 9.1 x 6.8 x 13.7 cm with surrounding inflammation and/or edema.      Impression    IMPRESSION:  1.  Large hematoma within the subcutaneous soft tissues along the superficial myofascial margin of the lower right gluteal musculature and upper right quadriceps musculature suggestive of  internal degloving injury in the setting of a Mccall-Tim type   lesion. The hematoma measures 9.1 x 6.8 x 13.7 cm with surrounding information and/or edema.  2.  Both hips negative for fracture or CT evidence of avascular necrosis. There is mild degenerative change. No significant effusion.  3.  Pelvis negative for fracture. Additional degenerative change at the SI joints.  4.  Advanced degenerative change within the visualized portion of the lower lumbar spine.  5.  Intrapelvic contents negative.   CTA Lower Extremity Right with Contrast    Narrative    EXAM: CTA LOWER EXTREMITY RIGHT WITH CONTRAST  LOCATION: [facility]  DATE/TIME: 2/18/2023 7:04 PM    INDICATION: hematoma, ? active extravasation  COMPARISON: CT pelvis 02/08/2023  TECHNIQUE: Helical acquisition through the pelvis, and bilateral lower extremities was performed during the arterial phase of contrast enhancement using IV Contrast. 2D and 3D reconstructions were performed by the CT technologist. Dose reduction   techniques were used.   CONTRAST: 100mL Isovue 370    FINDINGS:  AORTA: Normal.    RIGHT LEG: Right common, internal, and external iliac arteries appear normal. Superficial femoral and deep femoral arteries appear normal. Popliteal artery appears normal. There is no significant opacification below the knee, likely related to bolus   timing.    There is a heterogeneous rounded and elongate subcutaneous mass at the level of the right greater trochanter, which measures 15 cm long by 6 cm transversely by 10 cm AP. There is surrounding subcutaneous edema and a small amount of fluid within the   deeper muscular layers. No associated extravascular contrast material to suggest active bleeding.    LEFT LEG: Left common, internal, and external iliac arteries appear normal. Superficial femoral and deep femoral arteries appear normal. Popliteal artery appears normal. There is no significant opacification below the knee, likely  related to bolus   timing.    PELVIS: Mild sigmoid diverticulosis. No free air, free fluid, or inflammatory change. No adenopathy. Urinary bladder unremarkable. Prostate gland unremarkable.    Mild degenerative change in the lower lumbar spine. No suspicious bone lesion. No fracture.      Impression    IMPRESSION:  1.  Large approximate 15 x 6 x 10 cm subcutaneous hematoma lateral to the right hip (Mccall-Tim type lesion) without evidence for active bleeding.

## 2023-02-19 NOTE — PLAN OF CARE
Goal Outcome Evaluation:      Plan of Care Reviewed With: patient    Overall Patient Progress: no changeOverall Patient Progress: no change       2300- 0700 IM status. Patient came up to floor from ED at 2345. Alert and oriented x4. VSS on RA. Not OOB. On clear liquids, NPO starting at 0500. Lung sounds clear. BS+. BM-. Voiding adequately via urinal. R hip to thigh, bruised, some mild edema on area. Bruised area, marked- unchanged. Thigh circumference 57-58 cm. CMS intact. Pulses palpable. Denies pain/ nausea. Tele NSR, laci at times @50's. LR infusing 100ml/hr.

## 2023-02-19 NOTE — PROGRESS NOTES
[facility]    Hospitalist Progress Note    Interval History   - Doing well, has not gotten up. Back on Plavix per Cardiology, next dose this afternoon. Good DP PT pulses on R leg, patient denies any RLE pain or tingling. Pain controlled with just tylenol.  - Wife at bedside updated    Assessment & Plan   Summary: Timothy Balderas is a 62 year old male with PMH CAD s/p PCI 12/2022 who was admitted on 2/18/2023 with a fall while skiing resulting in a large right lateral hip/thigh hematoma.    Fall while skiing  Large hematoma right lateral hip ([id number]), Mccall-Tim type lesion  Acute blood loss anemia  Leukocytosis, resolved  Complicated by episode of hypotension, responsive to IVF. CT pelvis bone w/o contrast: large hematoma along lower right gluteal musculature and upper right quad. No fracture or avascular necrosis in either hip. Pelvis negative for fracture. CTA: no evidence active bleeding. Hgb 2mo ago was 14.1, down to 12.3 on ED presentation, then 10.5-->10.1-->9.3-->9.7. No evidence of compartment syndrome.  - Appreciate Ortho consult   - Remains NPO, defer diet to them  - Can be taken off IMC  - Check Hgb q12h  - Up with assist  - Scheduled tylenol, PRN oxycodone, IV dilaudid for pain  - Transfuse for active bleeding or Hgb <7  - Continue IVF while NPO    CAD s/p PCI to prox-mid LAD with JOHN on 12/12/22  Hypertension  Hyperlipidemia  PTA ASA 81mg, lipitor 40mg, plavix 75mg, losartan 50mg and lopressor 25mg BID  - Appreciate Cardiology consult   - Back on ASA and Plavix  - Patient reports HRs in 40s and SBP 100s at home since starting lopressor and ++fatigue, hoping to decrease some of his antihypertensives--likely hold metoprolol at discharge  - Continue to hold losartan    Mildly elevated Cr, resolved    GERD   Pepcid BID    Allergies  Asthma  - PTA astelin and breo continued    DVT Prophylaxis: Pneumatic Compression Devices  Code Status: Full Code  PT/OT: not needed  Diet: NPO  per Anesthesia Guidelines for Procedure/Surgery Except for: Meds      Disposition: Expected discharge probably tomorrow morning    Abad Jones MD, MD  Text Page  (7am to 6pm)  -Data reviewed today: I reviewed all new labs and imaging results over the last 24 hours.    Physical Exam   Temp: 98.7  F (37.1  C) Temp src: Oral BP: 107/60 Pulse: 58   Resp: 13 SpO2: 97 % O2 Device: None (Room air)    Vitals:    02/18/23 1748   Weight: 78.5 kg (173 lb)     Vital Signs with Ranges  Temp:  [97.8  F (36.6  C)-98.7  F (37.1  C)] 98.7  F (37.1  C)  Pulse:  [50-85] 58  Resp:  [8-22] 13  BP: ()/(39-77) 107/60  SpO2:  [91 %-100 %] 97 %  I/O last 3 completed shifts:  In: 951.67 [P.O.:360; I.V.:591.67]  Out: 560 [Urine:560]  O2 requirements: none    Constitutional: Male in NAD  HEENT: Eyes nonicteric, oral mucosa moist  Cardiovascular: RRR, normal S1/2, no m/r/g  Respiratory: CTAB, no wheezing or crackles  Vascular: No LE pitting edema, strong DP PT pulses  GI: Normoactive bowel sounds, nontender, nondistended  Skin/Integumen: Right hip, thigh hematoma/bruising well demarcated  Neuro/Psych: Appropriate affect and mood. A&Ox3, moves all extremities    Medications     lactated ringers 100 mL/hr at 02/19/23 1007       acetaminophen  975 mg Oral Q8H     aspirin  81 mg Oral Daily     atorvastatin  40 mg Oral Daily     azelastine  1 spray Both Nostrils BID     clopidogrel  75 mg Oral Daily     famotidine  20 mg Oral BID     [Held by provider] losartan  50 mg Oral At Bedtime     [Held by provider] metoprolol tartrate  25 mg Oral BID     sodium chloride (PF)  3 mL Intracatheter Q8H     sodium chloride (PF)  3 mL Intracatheter Q8H       Data   Recent Labs   Lab 02/19/23  0638 02/19/23  0214 02/18/23  2347 02/18/23  2310 02/18/23  2036 02/18/23  1754   WBC 6.7  --   --   --   --  15.1*   HGB 9.7* 9.3*  --  10.1*   < > 12.3*   MCV 93  --   --   --   --  95     --   --   --   --  369     --   --   --   --  137    POTASSIUM 4.0  --   --   --   --  3.9   CHLORIDE 108*  --   --   --   --  100   CO2 25  --   --   --   --  29   BUN 14.0  --   --   --   --  23.1*   CR 0.89  --   --   --   --  1.24*   ANIONGAP 6*  --   --   --   --  8   KAI 8.4*  --   --   --   --  9.3   *  --  101*  --   --  126*   ALBUMIN  --   --   --   --   --  3.9   PROTTOTAL  --   --   --   --   --  6.4   BILITOTAL  --   --   --   --   --  0.4   ALKPHOS  --   --   --   --   --  56   ALT  --   --   --   --   --  38   AST  --   --   --   --   --  31    < > = values in this interval not displayed.       Imaging:   Recent Results (from the past 24 hour(s))   CT Pelvis Bone wo Contrast    Narrative    EXAM: CT PELVIS BONE WO CONTRAST  LOCATION: [facility]  DATE/TIME: 2/18/2023 6:47 PM    INDICATION: Trauma, pain  COMPARISON: None.  TECHNIQUE: CT scan of the pelvis was performed without IV contrast. Multiplanar reformats were obtained. Dose reduction techniques were used.  CONTRAST: None.    FINDINGS:    Both hips are negative for fracture or CT evidence of avascular necrosis. There is mild degenerative change of both hip joints. Findings are fairly symmetric.    The remainder of the bony pelvis is negative for acute fracture. There is degenerative change at the SI joints bilaterally.    Degenerative change within the visualized portion of the lower lumbar spine.    The intrapelvic contents are negative for abnormal mass, free fluid, or lymphadenopathy.    There is a large hematoma within the subcutaneous soft tissues along the superficial margin of the right gluteal musculature and upper aspect of the quadriceps musculature. Findings are suggestive of internal degloving Mccall-Tim type lesion. The   hematoma measures 9.1 x 6.8 x 13.7 cm with surrounding inflammation and/or edema.      Impression    IMPRESSION:  1.  Large hematoma within the subcutaneous soft tissues along the superficial myofascial margin of the lower right  gluteal musculature and upper right quadriceps musculature suggestive of internal degloving injury in the setting of a Mccall-Tim type   lesion. The hematoma measures 9.1 x 6.8 x 13.7 cm with surrounding information and/or edema.  2.  Both hips negative for fracture or CT evidence of avascular necrosis. There is mild degenerative change. No significant effusion.  3.  Pelvis negative for fracture. Additional degenerative change at the SI joints.  4.  Advanced degenerative change within the visualized portion of the lower lumbar spine.  5.  Intrapelvic contents negative.   CTA Lower Extremity Right with Contrast    Narrative    EXAM: CTA LOWER EXTREMITY RIGHT WITH CONTRAST  LOCATION: [facility]  DATE/TIME: 2/18/2023 7:04 PM    INDICATION: hematoma, ? active extravasation  COMPARISON: CT pelvis 02/08/2023  TECHNIQUE: Helical acquisition through the pelvis, and bilateral lower extremities was performed during the arterial phase of contrast enhancement using IV Contrast. 2D and 3D reconstructions were performed by the CT technologist. Dose reduction   techniques were used.   CONTRAST: 100mL Isovue 370    FINDINGS:  AORTA: Normal.    RIGHT LEG: Right common, internal, and external iliac arteries appear normal. Superficial femoral and deep femoral arteries appear normal. Popliteal artery appears normal. There is no significant opacification below the knee, likely related to bolus   timing.    There is a heterogeneous rounded and elongate subcutaneous mass at the level of the right greater trochanter, which measures 15 cm long by 6 cm transversely by 10 cm AP. There is surrounding subcutaneous edema and a small amount of fluid within the   deeper muscular layers. No associated extravascular contrast material to suggest active bleeding.    LEFT LEG: Left common, internal, and external iliac arteries appear normal. Superficial femoral and deep femoral arteries appear normal. Popliteal artery  appears normal. There is no significant opacification below the knee, likely related to bolus   timing.    PELVIS: Mild sigmoid diverticulosis. No free air, free fluid, or inflammatory change. No adenopathy. Urinary bladder unremarkable. Prostate gland unremarkable.    Mild degenerative change in the lower lumbar spine. No suspicious bone lesion. No fracture.      Impression    IMPRESSION:  1.  Large approximate 15 x 6 x 10 cm subcutaneous hematoma lateral to the right hip (Mccall-Tim type lesion) without evidence for active bleeding.

## 2023-02-19 NOTE — ED NOTES
[facility]  ED Nurse Handoff Report    ED Chief complaint: Hip Pain      ED Diagnosis:   Final diagnoses:   Hematoma of muscle       Code Status: hospitalist to address    Allergies:   Allergies   Allergen Reactions    Erythromycin        Patient Story: Pt came in through triage after having a skiing accident. Pt has right thigh hematoma and swelling. Pt is on Plavix.  Focused Assessment:  Pt has swelling and bruising noted to right upper leg. Pt has strong pulses noted to right foot with cap refill less than 3 seconds. Pt is alert and oriented. Respirations are even, easy, and unlabored. Pt states pain 1/10.     Treatments and/or interventions provided: IV inserted and blood drawn. Pt given pain medication and fluid bolus. Pt also received CT scan  Patient's response to treatments and/or interventions: Pt responded well to fluid bolus for hypotension. Pt no longer requiring pain medication with pain 1/10    To be done/followed up on inpatient unit:  inpatient orders, monitor blood pressure    Does this patient have any cognitive concerns?: NA    Activity level - Baseline/Home:  Independent  Activity Level - Current:   Stand with assist x2    Patient's Preferred language: English   Needed?: No    Isolation: None  Infection: Not Applicable  Patient tested for COVID 19 prior to admission: NO  Bariatric?: No    Vital Signs:   Vitals:    02/18/23 2030 02/18/23 2039 02/18/23 2040 02/18/23 2045   BP: (!) 70/43 (!) 72/39  105/54   Pulse: 75 56  54   Resp:       Temp:       TempSrc:       SpO2: 98% 91% 96% 99%   Weight:       Height:           Cardiac Rhythm:     Was the PSS-3 completed:   Yes  What interventions are required if any?               Family Comments: wife at bedside  OBS brochure/video discussed/provided to patient/family:               Name of person given brochure if not patient:               Relationship to patient:     For the majority of the shift this patient's behavior was  Green.   Behavioral interventions performed were NA.    ED NURSE PHONE NUMBER: [phone]

## 2023-02-19 NOTE — PROGRESS NOTES
RECEIVING UNIT ED HANDOFF REVIEW    ED Nurse Handoff Report was reviewed by: Bhanu Helm RN on February 18, 2023 at 9:15 PM

## 2023-02-19 NOTE — CONSULTS
CARDIOLOGY CONSULTATION  February 19, 2023    REQUESTING PROVIDER:  DAISY Weldon MD    REASON FOR CONSULTATION: Management of antiplatelet therapy in the setting of traumatic thigh hematoma.        HISTORY OF PRESENT ILLNESS:    62-year-old male with CAD/angina who recently underwent PCI of proximal to mid LAD with single 2.5 x 38 mm JOHN (12/12/2022, Dr. Chandler Alonso).     Yesterday he fell while skiing and later developed discomfort around his right hip/buttock area.  In the emergency room he was lightheaded/blurry vision and was found to have a hemoglobin drop.  He was given IV fluid bolus.  CT of the pelvis showed a large hematoma along right gluteal muscle and upper right quad.  No fracture.  No evidence of active bleeding.    Clopidogrel and aspirin were placed on hold.    Otherwise the patient tells me today that he recovered nicely after his cardiac procedure.  His BP at home is often low with systolic in the low 100s.  He is typically not symptomatic with it.  No chest pain, syncope near syncope, orthopnea/PND    Social history: The patient is .  He is a non-smoker.  He drinks modest amounts of alcohol.  Family history is positive for premature CAD in his father.      DIAGNOSTIC STUDIES:  Labs: Sodium 139, potassium 4.0, creatinine 0.89, hematocrit 28.8%, hemoglobin 9.7, platelets 295 K  12-lead ECG: Sinus bradycardia at 41, otherwise normal      IMPRESSION:  1. Recent LAD PCI with 2.5 mm JOHN.    2. Traumatic thigh hematoma.  Significant size but no evidence of compartment syndrome thus far.  3. Reported low normal blood pressure at home, without symptoms.  May resume metoprolol in 2-3 days and losartan in 4-5 days.    RECOMMENDATIONS:    1. Resume aspirin and clopidogrel.  Given the age and location of the stent, current risk/benefit ratio of holding versus continuing DAPT, favors continuation.  2. The patient was advised to avoid physical activities with risk of injury for as long as he is on  "DAPT.  3. Sign off, please call with new questions.    I appreciate the opportunity to be part of this patient's care.  Please feel free to call me with any questions.    Moderate complexity medical decision-making.     Jeaneth Boswell MD, [facility]        PHYSICAL EXAM:  Vitals: /60   Pulse 58   Temp 98.7  F (37.1  C) (Oral)   Resp 13   Ht 1.702 m (5' 7\")   Wt 78.5 kg (173 lb)   SpO2 97%   BMI 27.10 kg/m      Intake/Output Summary (Last 24 hours) at 2/19/2023 1215  Last data filed at 2/19/2023 1132  Gross per 24 hour   Intake 951.67 ml   Output 1110 ml   Net -158.33 ml     Vitals:    02/18/23 1748   Weight: 78.5 kg (173 lb)     Constitutional:  Alert and oriented x3.  Pt appears comfortable, has no CP or respiratory distress.  Head: Normocephalic and atraumatic.   Skin:  Normal color and texture.  No rashes, lesions or eruptions.  Eyes:  no jaundice, EOMI.  ENT:  Supple, normal JVP, no apparent thyroid enlargement.  Lymph:  No lymphadenopathy   Chest/Lungs:  Clear bilaterally, no rales or wheezing.  Cardiac:  Regular rhythm, normal S1 and S2.  No murmur, rub or gallop.    GI:  Normal bowel sounds. Abdomen is soft, non-tender & not distended.  No rebound or guarding.    Extremities: Palpable hematoma in the right upper lateral thigh.  Radial pulses 2+ bilaterally.  DP and PT pulses palpable bilaterally.  No lower extremity edema is present.  RLE is warm and well-perfused.  Neurological:  CN 2-12 grossly intact.  Strength in UE is normal & symmetric.    Back:  No CVA tenderness.     REVIEW OF SYSTEMS:  A complete review of system was performed and was negative with the exception of what was described in the HPI section.     CURRENT MEDICATIONS:    acetaminophen  975 mg Oral Q8H     [Held by provider] aspirin  81 mg Oral Daily     atorvastatin  40 mg Oral Daily     azelastine  1 spray Both Nostrils BID     [Held by provider] clopidogrel  75 mg Oral Daily     famotidine  20 mg Oral BID     [Held by provider] " losartan  50 mg Oral At Bedtime     [Held by provider] metoprolol tartrate  25 mg Oral BID     sodium chloride (PF)  3 mL Intracatheter Q8H     sodium chloride (PF)  3 mL Intracatheter Q8H       ALLERGIES     Allergies   Allergen Reactions     Erythromycin        PAST MEDICAL HISTORY:  Past Medical History:   Diagnosis Date     Unspecified asthma(493.90)        PAST SURGICAL HISTORY:  Past Surgical History:   Procedure Laterality Date     CV CORONARY ANGIOGRAM N/A 2022    Procedure: Coronary Angiogram;  Surgeon: Xavier Alonso MD;  [facility]     CV FRACTIONAL FLOW RATIO WIRE N/A 2022    Procedure: Fractional Flow Ratio Wire;  Surgeon: Xavier Alonso MD;  Location: [facility] CARDIAC CATH [facility]     CV INSTANTANEOUS WAVE-FREE RATIO N/A 2022    Procedure: Instantaneous Wave-Free Ratio;  Surgeon: Xavier Alonso MD;  Location: [facility] CARDIAC CATH LAB     CV INTRAVASULAR ULTRASOUND N/A 2022    Procedure: Intravascular Ultrasound;  Surgeon: Xavier Alonso MD;  Location: [facility] CARDIAC CATH LAB     CV PCI STENT DRUG ELUTING N/A 2022    Procedure: Percutaneous Coronary Intervention Stent;  Surgeon: Xaiver Alonso MD;  Location: [facility] CARDIAC CATH LAB     ZZC NONSPECIFIC PROCEDURE      ORIF L middle finger       FAMILY HISTORY:  Family History   Problem Relation Age of Onset     Hypertension Mother         alive age 73 arthritis     Heart Disease Father         alive age 73     Musculoskeletal Disorder Sister          age 42 ALS     Neurologic Disorder Sister         alive age 43 migranes     Family History Negative Sister         alive age 41     Breast Cancer Paternal Aunt              Breast Cancer Paternal Aunt              Breast Cancer Paternal Aunt              Diabetes Maternal Aunt          late 20's       SOCIAL HISTORY:  Social History     Socioeconomic History      "Marital status:      Spouse name: Jeannette     Number of children: 3   Occupational History     Occupation: clinical research manager     Comment: advanced respiratory   Tobacco Use     Smoking status: Never     Smokeless tobacco: Never   Vaping Use     Vaping Use: Never used   Substance and Sexual Activity     Alcohol use: Yes     Comment: occasional     Drug use: No     Sexual activity: Yes     Partners: Female         Recent Lab Results:  Recent Labs   Lab 02/19/23  0638 02/19/23  0214 02/18/23  2347 02/18/23  2310 02/18/23  2036 02/18/23  1754   WBC 6.7  --   --   --   --  15.1*   HGB 9.7* 9.3*  --  10.1*   < > 12.3*   MCV 93  --   --   --   --  95     --   --   --   --  369     --   --   --   --  137   POTASSIUM 4.0  --   --   --   --  3.9   CHLORIDE 108*  --   --   --   --  100   CO2 25  --   --   --   --  29   BUN 14.0  --   --   --   --  23.1*   CR 0.89  --   --   --   --  1.24*   ANIONGAP 6*  --   --   --   --  8   KAI 8.4*  --   --   --   --  9.3   *  --  101*  --   --  126*   ALBUMIN  --   --   --   --   --  3.9   PROTTOTAL  --   --   --   --   --  6.4   BILITOTAL  --   --   --   --   --  0.4   ALKPHOS  --   --   --   --   --  56   ALT  --   --   --   --   --  38   AST  --   --   --   --   --  31    < > = values in this interval not displayed.         Clinically Significant Risk Factors Present on Admission         # Hypocalcemia: Lowest Ca = 8.4 mg/dL in last 2 days, will monitor and replace as appropriate        # Drug Induced Platelet Defect: home medication list includes an antiplatelet medication   # Overweight: Estimated body mass index is 27.1 kg/m  as calculated from the following:    Height as of this encounter: 1.702 m (5' 7\").    Weight as of this encounter: 78.5 kg (173 lb).                                                 "

## 2023-02-19 NOTE — ED NOTES
Pt began to by hypotensive. 2nd IV established. Pt reports he is beginning to feel light headed with blurry vision. Pt leg does not appear to be more swollen. Pulses are strong in right foot. Pt denies increase in pain. Provider notified. Orders have been completed with increased fluid on pressure bag

## 2023-02-19 NOTE — PHARMACY-ADMISSION MEDICATION HISTORY
Pharmacy Medication History  Admission medication history interview status for the 2/18/2023  admission is complete. See EPIC admission navigator for prior to admission medications     Location of Interview: Patient room  Medication history sources: Patient    Significant changes made to the medication list:  None    In the past week, patient estimated taking medication this percent of the time: 50-90% due to other    Medication Affordability:       Additional medication history information:   Patient reports taking Niacin about 5 days per week.    Medication reconciliation completed by provider prior to medication history? No    Time spent in this activity: 20 minutes    Prior to Admission medications    Medication Sig Last Dose Taking? Auth Provider Long Term End Date   albuterol (PROVENTIL HFA: VENTOLIN HFA) 108 (90 BASE) MCG/ACT inhaler Inhale 2 puffs into the lungs every 6 hours as needed for shortness of breath / dyspnea.  at prn Yes Hu Garcia MD Yes    aspirin (ASA) 81 MG EC tablet Take 1 tablet by mouth daily 2/18/2023 Yes Reported, Patient     atorvastatin (LIPITOR) 40 MG tablet Take 1 tablet (40 mg) by mouth daily 2/17/2023 at pm Yes Deric Garcia MD Yes    azelastine (ASTELIN) 137 MCG/SPRAY nasal spray [place] 1 spray into both nostrils 2 times daily 2/18/2023 at am Yes Reported, Patient     clopidogrel (PLAVIX) 75 MG tablet Take 1 tablet (75 mg) by mouth daily Take 300mg (four 75mg tablets) day 1 then 75mg everyday thereafter. 2/18/2023 at 0700 Yes Dedra Jerry, APRN CNP Yes    famotidine (PEPCID) 20 MG tablet Take 20 mg by mouth 2 times daily 2/18/2023 at am Yes Reported, Patient     FLUTICASONE PROPIONATE, NASAL, NA Instill 1 spray in both nostrils twice daily 2/18/2023 at am Yes Reported, Patient     fluticasone-vilanterol (BREO ELLIPTA) 100-25 MCG/ACT inhaler Inhale 1 puff into the lungs daily as needed  at prn Yes Reported, Patient     losartan (COZAAR) 50 MG tablet Take 50 mg by  mouth At Bedtime 2/17/2023 at pm Yes Reported, Patient Yes    metoprolol tartrate (LOPRESSOR) 25 MG tablet Take 1 tablet (25 mg) by mouth 2 times daily 2/18/2023 Yes Deric Garcia MD Yes    nitroGLYcerin (NITROSTAT) 0.4 MG sublingual tablet For chest pain place 1 tablet under the tongue every 5 minutes for 3 doses. If symptoms persist 5 minutes after 2nd dose call 911.  at prn- has never used Yes Xavier Alonso MD Yes    triamcinolone (KENALOG) 0.1 % ointment APPLY SPARINGLY TO HANDS TWICE DAILY AS NEEDED TO CONTROL CONDITION.  at prn Yes Svetlana Aggarwal MD     niacin 500 MG tablet Take 500 mg by mouth daily   Reported, Patient         The information provided in this note is only as accurate as the sources available at the time of update(s)

## 2023-02-20 VITALS
HEART RATE: 69 BPM | BODY MASS INDEX: 27.15 KG/M2 | HEIGHT: 67 IN | SYSTOLIC BLOOD PRESSURE: 124 MMHG | WEIGHT: 173 LBS | DIASTOLIC BLOOD PRESSURE: 63 MMHG | OXYGEN SATURATION: 97 % | RESPIRATION RATE: 16 BRPM | TEMPERATURE: 98 F

## 2023-02-20 LAB
ANION GAP SERPL CALCULATED.3IONS-SCNC: 7 MMOL/L (ref 7–15)
BUN SERPL-MCNC: 12.5 MG/DL (ref 8–23)
CALCIUM SERPL-MCNC: 8.5 MG/DL (ref 8.8–10.2)
CHLORIDE SERPL-SCNC: 109 MMOL/L (ref 98–107)
CREAT SERPL-MCNC: 0.84 MG/DL (ref 0.67–1.17)
DEPRECATED HCO3 PLAS-SCNC: 25 MMOL/L (ref 22–29)
GFR SERPL CREATININE-BSD FRML MDRD: >90 ML/MIN/1.73M2
GLUCOSE SERPL-MCNC: 105 MG/DL (ref 70–99)
HGB BLD-MCNC: 8.9 G/DL (ref 13.3–17.7)
POTASSIUM SERPL-SCNC: 4.2 MMOL/L (ref 3.4–5.3)
SODIUM SERPL-SCNC: 141 MMOL/L (ref 136–145)

## 2023-02-20 PROCEDURE — 99239 HOSP IP/OBS DSCHRG MGMT >30: CPT | Performed by: INTERNAL MEDICINE

## 2023-02-20 PROCEDURE — G0378 HOSPITAL OBSERVATION PER HR: HCPCS

## 2023-02-20 PROCEDURE — 250N000013 HC RX MED GY IP 250 OP 250 PS 637: Performed by: STUDENT IN AN ORGANIZED HEALTH CARE EDUCATION/TRAINING PROGRAM

## 2023-02-20 PROCEDURE — 85018 HEMOGLOBIN: CPT | Performed by: INTERNAL MEDICINE

## 2023-02-20 PROCEDURE — 82310 ASSAY OF CALCIUM: CPT | Performed by: INTERNAL MEDICINE

## 2023-02-20 PROCEDURE — 250N000013 HC RX MED GY IP 250 OP 250 PS 637: Performed by: INTERNAL MEDICINE

## 2023-02-20 PROCEDURE — 250N000013 HC RX MED GY IP 250 OP 250 PS 637: Performed by: HOSPITALIST

## 2023-02-20 PROCEDURE — 36415 COLL VENOUS BLD VENIPUNCTURE: CPT | Performed by: INTERNAL MEDICINE

## 2023-02-20 RX ORDER — LOSARTAN POTASSIUM 50 MG/1
50 TABLET ORAL AT BEDTIME
Start: 2023-02-20 | End: 2023-02-27

## 2023-02-20 RX ADMIN — FLUTICASONE PROPIONATE 1 SPRAY: 50 SPRAY, METERED NASAL at 09:07

## 2023-02-20 RX ADMIN — AZELASTINE HYDROCHLORIDE 1 SPRAY: 137 SPRAY, METERED NASAL at 09:03

## 2023-02-20 RX ADMIN — ASPIRIN 81 MG: 81 TABLET, COATED ORAL at 09:03

## 2023-02-20 RX ADMIN — ATORVASTATIN CALCIUM 40 MG: 40 TABLET, FILM COATED ORAL at 09:03

## 2023-02-20 RX ADMIN — CLOPIDOGREL BISULFATE 75 MG: 75 TABLET ORAL at 09:03

## 2023-02-20 RX ADMIN — FAMOTIDINE 20 MG: 20 TABLET, FILM COATED ORAL at 09:03

## 2023-02-20 RX ADMIN — ACETAMINOPHEN 975 MG: 325 TABLET, FILM COATED ORAL at 09:02

## 2023-02-20 RX ADMIN — ACETAMINOPHEN 975 MG: 325 TABLET, FILM COATED ORAL at 00:24

## 2023-02-20 ASSESSMENT — ACTIVITIES OF DAILY LIVING (ADL)
ADLS_ACUITY_SCORE: 22

## 2023-02-20 NOTE — CONSULTS
Consult Date: 02/19/2023    HISTORY OF PRESENT ILLNESS:  I was asked to provide Orthopedic consultation for this 62-year-old male who fell skiing at [facility] yesterday and did not sustain  fracture; however, sustained a rather substantial hematoma in his right thigh.  His medical history is complicated by the fact that he had a cardiac stent placed 2 months ago and he is on anticoagulation in the form of Plavix.  So he developed this rather significant hematoma.  He is otherwise healthy other than this cardiac stent placement.  He does have a history of asthma for which he takes and uses an inhaler.  He states he has been in his normal good health.  The fall occurred while skiing, and he was wearing a helmet.  There was no loss of consciousness.    REVIEW OF SYSTEMS:  He denies recent fever, chills, nausea, vomiting, diarrhea, chest pain, shortness of breath.    PHYSICAL EXAMINATION:    GENERAL:  He is a healthy-appearing 62-year-old who is alert and oriented x 3, pleasant, in no acute distress.  He is afebrile.  Vital signs are stable.  LUNGS:  Clear to auscultation bilaterally.  CARDIOVASCULAR:  Demonstrates regular rate and rhythm.  ABDOMEN:  Benign.  EXTREMITIES:  Examination of his right leg.  He has impressive ecchymosis on the lateral aspect of his leg.  He has full painless range of motion of the knee, and he has minimal pain with range of motion of his hips.  His thigh anteriorly, posteriorly, medially and laterally is soft.  No evidence of compartment syndrome.  He has an intact neurologic exam in both lower extremities.  His skin is intact in both lower extremities.  Both lower extremities are well perfused.  He has a palpable dorsalis pedis and posterior tibial pulse bilaterally.    IMAGING:  X-rays were not available at the time of this dictation.    IMPRESSION AND PLAN:  A healthy 62-year-old male with:  1.  A hematoma of the left thigh.  He is maintained on Plavix for anticoagulation for his cardiac  stent.  There is no evidence of a compartment syndrome.  At this time, will continue to monitor this.  He can weightbear as tolerated.  No running or any athletic activity for the next several weeks.  He can follow up in our clinic on an as needed basis.   2.  Status post cardiac stent placement fully anticoagulated with Plavix.  This is being managed by Cardiology.    Sabi Garner MD        D: 2023   T: 2023   MT: [id number]    Name:     [id number]  MRN:      [id number]        Account:      [id number]   :      1960           Consult Date: 2023     Document: [id number]

## 2023-02-20 NOTE — DISCHARGE SUMMARY
[facility]    Hospitalist Discharge Summary       Date of Admission:  2/18/2023  Date of Discharge:  2/20/2023  Discharging Provider: Abad Jones MD      Discharge Diagnoses   Fall while skiing  Large hematoma right lateral hip ([id number]), Mccall-Tim type lesion  Acute blood loss anemia  Leukocytosis, resolved  Elevated creatinine, resolved    Follow-ups Needed After Discharge   Follow-up Appointments     Follow-up and recommended labs and tests       Follow up with primary care provider, Narendra Watkins, within 7 days for   hospital follow- up.  The following labs/tests are recommended: CBC in one   week.    - Follow up with Cardiology as previously scheduled  - Follow up with Cardiac rehab within 2 weeks           Hospital Course   Summary: Timothy Balderas is a 62 year old male with PMH CAD s/p PCI 12/2022 who was admitted on 2/18/2023 with a fall while skiing resulting in a large right lateral hip/thigh hematoma.   Initially complicated by episode of hypotension, responsive to IVF. CT pelvis bone w/o contrast: large hematoma along lower right gluteal musculature and upper right quad. No fracture or avascular necrosis in either hip. Pelvis negative for fracture. CTA: no evidence active bleeding. Hgb 2mo ago was 14.1, down to 12.3 on ED presentation, then 10.5-->10.1-->9.3-->9.7-->8.9 at discharge. No evidence of compartment syndrome, no surgical intervention per ortho. ASA and Plavix resumed by Cardiology on 2/20.   Improved, /63 on 2/20, discharged home with limits on activity and close follow up.  - Tylenol for pain  - Check CBC in one week    CAD s/p PCI to prox-mid LAD with JOHN on 12/12/22  Hypertension  Hyperlipidemia  Stable this admission  - Metoprolol held at discharge due to soft BPs  - Losartan held at discharge with instructions to restart when systolic BP hits 130 or above    Consultations This Hospital Stay   CARDIOLOGY IP CONSULT  ORTHOPEDIC SURGERY IP  CONSULT    Code Status   Full Code    Time Spent on this Encounter   I, Abad Jones, personally saw the patient today and spent approximately 35 minutes discharging this patient.       Abad Jones MD  [facility]  ______________________________________________________________________    Physical Exam   Vital Signs: Temp: 98  F (36.7  C) Temp src: Oral BP: 124/63 Pulse: 69   Resp: 16 SpO2: 97 % O2 Device: None (Room air)    Weight: 173 lbs 0 oz    Constitutional: Male in NAD  HEENT: Eyes nonicteric, oral mucosa moist  Cardiovascular: RRR, normal S1/2, no m/r/g  Respiratory: CTAB, no wheezing or crackles  Vascular: No LE pitting edema, strong DP PT pulses  GI: Normoactive bowel sounds, nontender, nondistended  Skin/Integumen: Right hip, thigh hematoma/bruising well demarcated  Neuro/Psych: Appropriate affect and mood. A&Ox3, moves all extremities       Primary Care Physician   Narendra Watkins    Discharge Disposition   Discharged to home  Condition at discharge: Stable    Significant Results and Procedures   Most Recent 3 CBC's:  Recent Labs   Lab Test 02/20/23  0511 02/19/23  1753 02/19/23  0638 02/18/23  2036 02/18/23  1754 12/12/22  0907   WBC  --   --  6.7  --  15.1* 7.0   HGB 8.9* 8.6* 9.7*   < > 12.3* 14.1   MCV  --   --  93  --  95 91   PLT  --   --  295  --  369 369    < > = values in this interval not displayed.     Most Recent 3 BMP's:  Recent Labs   Lab Test 02/20/23  0511 02/19/23  0638 02/18/23  2347 02/18/23  1754    139  --  137   POTASSIUM 4.2 4.0  --  3.9   CHLORIDE 109* 108*  --  100   CO2 25 25  --  29   BUN 12.5 14.0  --  23.1*   CR 0.84 0.89  --  1.24*   ANIONGAP 7 6*  --  8   KAI 8.5* 8.4*  --  9.3   * 102* 101* 126*     Most Recent 2 LFT's:  Recent Labs   Lab Test 02/18/23  1754 10/19/20  2006   AST 31 107*   ALT 38 125*   ALKPHOS 56 132   BILITOTAL 0.4 0.5     Most Recent 3 INR's:  Recent Labs   Lab Test 12/12/22  0907   INR 1.04     Most Recent  3 Troponin's:  Recent Labs   Lab Test 10/19/20  2006   TROPI <0.015     Most Recent 3 BNP's:No lab results found.  Most Recent D-dimer:No lab results found.  Most Recent Cholesterol Panel:  Recent Labs   Lab Test 11/23/22  1000   TRIG 55       Results for orders placed or performed during the hospital encounter of 02/18/23   CTA Lower Extremity Right with Contrast    Narrative    EXAM: CTA LOWER EXTREMITY RIGHT WITH CONTRAST  LOCATION: [facility]  DATE/TIME: 2/18/2023 7:04 PM    INDICATION: hematoma, ? active extravasation  COMPARISON: CT pelvis 02/08/2023  TECHNIQUE: Helical acquisition through the pelvis, and bilateral lower extremities was performed during the arterial phase of contrast enhancement using IV Contrast. 2D and 3D reconstructions were performed by the CT technologist. Dose reduction   techniques were used.   CONTRAST: 100mL Isovue 370    FINDINGS:  AORTA: Normal.    RIGHT LEG: Right common, internal, and external iliac arteries appear normal. Superficial femoral and deep femoral arteries appear normal. Popliteal artery appears normal. There is no significant opacification below the knee, likely related to bolus   timing.    There is a heterogeneous rounded and elongate subcutaneous mass at the level of the right greater trochanter, which measures 15 cm long by 6 cm transversely by 10 cm AP. There is surrounding subcutaneous edema and a small amount of fluid within the   deeper muscular layers. No associated extravascular contrast material to suggest active bleeding.    LEFT LEG: Left common, internal, and external iliac arteries appear normal. Superficial femoral and deep femoral arteries appear normal. Popliteal artery appears normal. There is no significant opacification below the knee, likely related to bolus   timing.    PELVIS: Mild sigmoid diverticulosis. No free air, free fluid, or inflammatory change. No adenopathy. Urinary bladder unremarkable. Prostate gland  unremarkable.    Mild degenerative change in the lower lumbar spine. No suspicious bone lesion. No fracture.      Impression    IMPRESSION:  1.  Large approximate 15 x 6 x 10 cm subcutaneous hematoma lateral to the right hip (Mccall-Tim type lesion) without evidence for active bleeding.   CT Pelvis Bone wo Contrast    Narrative    EXAM: CT PELVIS BONE WO CONTRAST  LOCATION: [facility]  DATE/TIME: 2/18/2023 6:47 PM    INDICATION: Trauma, pain  COMPARISON: None.  TECHNIQUE: CT scan of the pelvis was performed without IV contrast. Multiplanar reformats were obtained. Dose reduction techniques were used.  CONTRAST: None.    FINDINGS:    Both hips are negative for fracture or CT evidence of avascular necrosis. There is mild degenerative change of both hip joints. Findings are fairly symmetric.    The remainder of the bony pelvis is negative for acute fracture. There is degenerative change at the SI joints bilaterally.    Degenerative change within the visualized portion of the lower lumbar spine.    The intrapelvic contents are negative for abnormal mass, free fluid, or lymphadenopathy.    There is a large hematoma within the subcutaneous soft tissues along the superficial margin of the right gluteal musculature and upper aspect of the quadriceps musculature. Findings are suggestive of internal degloving Mccall-Tim type lesion. The   hematoma measures 9.1 x 6.8 x 13.7 cm with surrounding inflammation and/or edema.      Impression    IMPRESSION:  1.  Large hematoma within the subcutaneous soft tissues along the superficial myofascial margin of the lower right gluteal musculature and upper right quadriceps musculature suggestive of internal degloving injury in the setting of a Mccall-Tim type   lesion. The hematoma measures 9.1 x 6.8 x 13.7 cm with surrounding information and/or edema.  2.  Both hips negative for fracture or CT evidence of avascular necrosis. There is mild degenerative  change. No significant effusion.  3.  Pelvis negative for fracture. Additional degenerative change at the SI joints.  4.  Advanced degenerative change within the visualized portion of the lower lumbar spine.  5.  Intrapelvic contents negative.       Discharge Orders      CBC with platelets     Reason for your hospital stay    You were hospitalized for a large traumatic hematoma in your right hip/thigh.     Follow-up and recommended labs and tests     Follow up with primary care provider, Narendra Watkins, within 7 days for hospital follow- up.  The following labs/tests are recommended: CBC in one week.    - Follow up with Cardiology as previously scheduled  - Follow up with Cardiac rehab within 2 weeks     Activity    Your activity upon discharge: activity as tolerated at home. No running or impact activities until cleared by your doctor. Limit aerobic exercise in the first week.     Monitor and record    blood pressure daily     Discharge Instructions     Diet    Follow this diet upon discharge:       Regular Diet Adult     Discharge Medications   Current Discharge Medication List      CONTINUE these medications which have CHANGED    Details   losartan (COZAAR) 50 MG tablet Take 1 tablet (50 mg) by mouth At Bedtime Resume once your systolic blood pressure is 130 or above.    Associated Diagnoses: Status post coronary angiogram         CONTINUE these medications which have NOT CHANGED    Details   albuterol (PROVENTIL HFA: VENTOLIN HFA) 108 (90 BASE) MCG/ACT inhaler Inhale 2 puffs into the lungs every 6 hours as needed for shortness of breath / dyspnea.  Qty: 1 Inhaler, Refills: 6    Associated Diagnoses: Intermittent asthma      aspirin (ASA) 81 MG EC tablet Take 1 tablet by mouth daily      atorvastatin (LIPITOR) 40 MG tablet Take 1 tablet (40 mg) by mouth daily  Qty: 90 tablet, Refills: 3    Associated Diagnoses: Abnormal cardiovascular stress test      azelastine (ASTELIN) 137 MCG/SPRAY nasal spray [place] 1 spray  into both nostrils 2 times daily      clopidogrel (PLAVIX) 75 MG tablet Take 1 tablet (75 mg) by mouth daily Take 300mg (four 75mg tablets) day 1 then 75mg everyday thereafter.  Qty: 94 tablet, Refills: 3    Associated Diagnoses: Coronary artery disease involving native coronary artery of native heart without angina pectoris      famotidine (PEPCID) 20 MG tablet Take 20 mg by mouth 2 times daily      FLUTICASONE PROPIONATE, NASAL, NA Instill 1 spray in both nostrils twice daily      fluticasone-vilanterol (BREO ELLIPTA) 100-25 MCG/ACT inhaler Inhale 1 puff into the lungs daily as needed      nitroGLYcerin (NITROSTAT) 0.4 MG sublingual tablet For chest pain place 1 tablet under the tongue every 5 minutes for 3 doses. If symptoms persist 5 minutes after 2nd dose call 911.  Qty: 30 tablet, Refills: 11    Associated Diagnoses: CAD (coronary artery disease)      triamcinolone (KENALOG) 0.1 % ointment APPLY SPARINGLY TO HANDS TWICE DAILY AS NEEDED TO CONTROL CONDITION.  Qty: 30 g, Refills: 0    Associated Diagnoses: Hand eczema      niacin 500 MG tablet Take 500 mg by mouth daily         STOP taking these medications       metoprolol tartrate (LOPRESSOR) 25 MG tablet Comments:   Reason for Stopping:             Allergies   Allergies   Allergen Reactions     Erythromycin

## 2023-02-20 NOTE — PLAN OF CARE
Off IMC, Pt A&Ox 4. CMS intact. VSS on Ra, Tele SR. Up with 1 with gait belt and walker. Taking tylenol for pain.  Voiding in the BR. Good diuresis. Regular diet. Continue to monitor.

## 2023-02-20 NOTE — PLAN OF CARE
Neuro: A&O x4  Tele/cardiac: SR/SB  Respiration: on RA, denies SOB  Activity: SBA, WBAT RLE  Pain: denies  Drips: PIV SL  Drains/tubes: none  Skin: large bruise and edema to right hip/thigh,no change in marked boundary CMS intact,   GI/: continent, regular diet  Aggression color: green  Isolation: none  Plan: possible discharge today

## 2023-02-21 ENCOUNTER — TELEPHONE (OUTPATIENT)
Dept: CARDIOLOGY | Facility: CLINIC | Age: 63
End: 2023-02-21
Payer: COMMERCIAL

## 2023-02-21 NOTE — TELEPHONE ENCOUNTER
Patient was admitted to [facility] [place] on 2/18/23 with discomfort around his right hip/buttock area after a fall while skiing. In the emergency room pt was lightheaded/blurry vision and was found to have a hemoglobin drop. He was given IV fluid bolus.     CT of the pelvis showed a large hematoma along right gluteal muscle and upper right quad. No fracture. No evidence of active bleeding. Clopidogrel and aspirin were placed on hold.    PMH: CAD/angina who recently underwent PCI of proximal to mid LAD with single 2.5 x 38 mm JOHN (12/12/2022, Dr. Chandler Alonso), drinks modest amounts of alcohol. Family history is positive for premature CAD in his father.    PTA Plavix and ASA were resumed. Metoprolol discontinued and Losartan on hold until  or greater for hypotension at time of discharge. The patient was advised to avoid physical activities with risk of injury for as long as he is on DAPT.    Called patient to discuss any post hospital d/c questions he may have, review medication changes, and confirm f/u appts. Patient denied any questions regarding new medications or changes to PTA medications. States he restarted Losartan last PM for SBP of 135. SBP was 125/70's this AM    Patient denied any SOB, chest pain, or light headedness. States stiffness to right hip is gradually improving.    RN confirmed with patient that he has an OV on 3/24/23 at 0755 with RM Dedra Jerry at our [place] Office.    Patient advised to call clinic with any cardiac related questions or concerns prior to this rm't. Patient verbalized understanding and agreed with plan. DAISY Daily RN.

## 2023-02-22 ENCOUNTER — TELEPHONE (OUTPATIENT)
Dept: CARDIOLOGY | Facility: CLINIC | Age: 63
End: 2023-02-22
Payer: COMMERCIAL

## 2023-02-22 NOTE — TELEPHONE ENCOUNTER
Health Call Center    Phone Message    May a detailed message be left on voicemail: yes     Reason for Call: Other: Pt requesting a call back to discuss a recent skiing accident he had last weekend which he needed to be seen in the ED. Pt states they made a few changes to his medications because of a large hematoma he developed and he would like to discuss the changes. Please return call to discuss.      Action Taken: Other: Cardiology    Travel Screening: Not Applicable     Thank you!  [facility]

## 2023-02-22 NOTE — TELEPHONE ENCOUNTER
RN returned patient's call and he advised RN that his systolic was >130 by Sunday evening and so he restarted his losarten. Patient reports that his BP today was 132/78 with HR 68BPM. Patient is wondering if KRISTEN Colmenares would like him to restart the metoprolol. Patient was previously on 25mg BID, but did advise that his BP was 105/65 with HR in 40's. Patient is wondering if it is recommended to restart his metoprolol if he should start at a lower dose? RN will send updates to KRISTEN Colmenares for further review and recommendations.               Patient was admitted to [facility] on 2/18/23 with discomfort around his right hip/buttock area after a fall while skiing. In the emergency room pt was lightheaded/blurry vision and was found to have a hemoglobin drop. He was given IV fluid bolus.      CT of the pelvis showed a large hematoma along right gluteal muscle and upper right quad. No fracture. No evidence of active bleeding. Clopidogrel and aspirin were placed on hold.     PMH: CAD/angina who recently underwent PCI of proximal to mid LAD with single 2.5 x 38 mm JOHN (12/12/2022, Dr. Chandler Alonso), drinks modest amounts of alcohol. Family history is positive for premature CAD in his father.     PTA Plavix and ASA were resumed. Metoprolol discontinued and Losartan on hold until  or greater for hypotension at time of discharge. The patient was advised to avoid physical activities with risk of injury for as long as he is on DAPT.     Called patient to discuss any post hospital d/c questions he may have, review medication changes, and confirm f/u appts. Patient denied any questions regarding new medications or changes to PTA medications. States he restarted Losartan last PM for SBP of 135. SBP was 125/70's this AM     Patient denied any SOB, chest pain, or light headedness. States stiffness to right hip is gradually improving.     RN confirmed with patient that he has an OV on 3/24/23 at 0755 with KRISTEN Jerry at our [place]  Office.     Patient advised to call clinic with any cardiac related questions or concerns prior to this rm't. Patient verbalized understanding and agreed with plan. DAISY Daily RN.

## 2023-02-22 NOTE — TELEPHONE ENCOUNTER
RN called patient and updated him with plan. Patient verbalized understanding and is in agreement with plan. Patient will callback with any further questions or concerns prior to apt with KRISTEN Dedra in March.

## 2023-02-27 DIAGNOSIS — Z98.890 STATUS POST CORONARY ANGIOGRAM: ICD-10-CM

## 2023-02-27 RX ORDER — LOSARTAN POTASSIUM 50 MG/1
50 TABLET ORAL AT BEDTIME
Qty: 90 TABLET | Refills: 0 | Status: SHIPPED | OUTPATIENT
Start: 2023-02-27 | End: 2023-05-31

## 2023-03-24 ENCOUNTER — OFFICE VISIT (OUTPATIENT)
Dept: CARDIOLOGY | Facility: CLINIC | Age: 63
End: 2023-03-24
Attending: NURSE PRACTITIONER
Payer: COMMERCIAL

## 2023-03-24 VITALS
WEIGHT: 176.4 LBS | HEART RATE: 73 BPM | OXYGEN SATURATION: 98 % | DIASTOLIC BLOOD PRESSURE: 82 MMHG | BODY MASS INDEX: 26.73 KG/M2 | SYSTOLIC BLOOD PRESSURE: 138 MMHG | HEIGHT: 68 IN

## 2023-03-24 DIAGNOSIS — E78.5 HYPERLIPIDEMIA LDL GOAL <70: ICD-10-CM

## 2023-03-24 DIAGNOSIS — I25.10 CORONARY ARTERY DISEASE INVOLVING NATIVE CORONARY ARTERY OF NATIVE HEART WITHOUT ANGINA PECTORIS: ICD-10-CM

## 2023-03-24 PROCEDURE — 99214 OFFICE O/P EST MOD 30 MIN: CPT | Performed by: NURSE PRACTITIONER

## 2023-03-24 NOTE — LETTER
3/24/2023    Sagar Mosley MD  [address] [facility] [address]  [facility] [address]    RE: Timothy Balderas       Dear Colleague,     I had the pleasure of seeing Timothy Balderas in the [facility].    General Cardiology Clinic Progress Note  Timothy Balderas MRN# [id number]   YOB: 1960 Age: 62 year old     Primary cardiologist: Dr. Garcia    Reason for visit: Three month follow up     History of presenting illness:    Timothy Balderas, a pleasant 62 year old patient who has a past medical history significant for family history of premature CAD with his father having coronary disease in his early 50s and undergoing bypass, hyperlipidemia and previously noted coronary calcifications on CT scan.    The patient was seen in consultation by Dr. Garcia on 12/2/2022 due to exertional chest discomfort and an abnormal stress EKG.  There was 2 mm horizontal ST depression in inferior leads and lateral precordial leads and 1 mm ST elevation in lead aVR.  There was no inducible ischemia on the echocardiogram portion.  The patient and Dr. Garcia discussed proceeding with a CT coronary angiogram versus an invasive coronary angiogram and it was elected to proceed with the latter.  He was started on metoprolol 25 mg twice daily and his prior Lipitor was increased from 10 mg daily to 40 mg daily.    The angiogram was completed on 12/12/2022 and he was found to have single-vessel disease of the proximal to mid LAD that was hemodynamically significant (iFR 0.89 and FFR of 0.78).  There was mild nonobstructive disease elsewhere.  The proximal mid LAD was treated with 1 drug-eluting stent.  He was placed on DAPT with aspirin and ticagrelor for total of 12 months and would consider indefinite P2Y 12 inhibitor monotherapy afterwards.  He was transitioned from ticagrelor to clopidogrel due to cost.      Unfortunately, the patient was admitted [facility] on 2/18 after he had a traumatic thigh hematoma while  skiing.  He presented to the ED with discomfort and became lightheaded with blurry vision and found to have a hemoglobin drop.  He was given an IV fluid bolus and his CT of the pelvis noted a large hematoma along the right gluteal muscle in the upper quadrant without active bleeding and fracture.  Initially DAPT was placed on hold and cardiology was consulted.  He was evaluated by Dr. Boswell who recommended resumption of DAPT given age and location of the stent and discussed risk benefit of holding versus continuing with the patient.  The patient was advised to avoid physical activities as long as he is on DAPT.  During the admission metoprolol and losartan were discontinued due to symptomatic hypotension.  The patient has resumed losartan, but metoprolol remains on hold.  His BP is well controlled today and his heart rate is 73 bpm.  He tells me that when previously on metoprolol he was unable to get his heart rate up with activity and had persistently cold fingers.    Today Timothy returns for a 3-month follow-up.  He continues physical therapy for his hematoma and has put cardiac rehab on hold at this time.  Overall, he is progressing with his recovery from the hematoma and is eager to complete his physical therapy and return to cardiac rehab.  His last hemoglobin within our system was 8.9 (initially 14.1), and he tells me that his primary care provider checked it at his follow-up visit.  Unfortunately, the results are not available to us in care everywhere and we will request the records.    Diagnostic studies:    Stress echocardiogram (11/30/2022): Echocardiogram was negative for ischemia however ECG portion was positive for ischemic changes.    Coronary angiogram (12/12/2022): 30% proximal LAD, 40% mid LAD (lesion 1), 60% mid LAD (lesion 2) that was 30% stenosed into a sidebranch of the third diagonal.  The second LAD lesion was hemodynamically significant (iFR 0.89 and FFR 0.78) and was treated with a  "drug-eluting stent.  OM was 40% stenosed          Assessment and Plan:     ASSESSMENT:    1. Coronary artery disease    Status post drug-eluting stent to the LAD with mild nonobstructive disease elsewhere    DAPT with aspirin and Plavix that was interrupted briefly during a hospitalization in February for a hematoma posterior traumatic injury.    Last LDL 77 (11/23/2022) on atorvastatin 40 mg daily    2. Hypertension    Well controlled     Losartan 50 mg daily    PLAN:     1. At this time we will remain off metoprolol given low resting heart rate and difficulty with getting heart rate elevated with activity.  2. Recommend returning to cardiac rehab when physically able   3. Follow-up with Dr. Garcia or KRISTEN in December 2023 to discuss discontinuation of clopidogrel       Orders this Visit:  Orders Placed This Encounter   Procedures     Follow-Up with Cardiology     No orders of the defined types were placed in this encounter.    There are no discontinued medications.    Today's clinic visit entailed:  Review of the result(s) of each unique test - SE, cath  Prescription drug management  20 minutes spent on the date of the encounter doing chart review, history and exam, documentation and further activities per the note  Provider  Link to [employer] Help Grid     The level of medical decision making during this visit was of moderate complexity.           Review of Systems:     Review of Systems:  Skin:  not assessed     Eyes:  not assessed    ENT:  not assessed    Respiratory:  Negative    Cardiovascular:  Negative    Gastroenterology: not assessed    Genitourinary:  not assessed    Musculoskeletal:  not assessed    Neurologic:  not assessed    Psychiatric:  not assessed    Heme/Lymph/Imm:  not assessed    Endocrine:  Negative              Physical Exam:     Vitals: /82 (Cuff Size: Adult Regular)   Pulse 73   Ht 1.715 m (5' 7.52\")   Wt 80 kg (176 lb 6.4 oz)   SpO2 98%   BMI 27.20 kg/m    Constitutional: Well " nourished and in no apparent distress.  Eyes: Pupils equal, round. Sclerae anicteric.   HEENT: Normocephalic, atraumatic.   Neck: Supple. JVD   Respiratory: Breathing non-labored. Lungs clear to auscultation bilaterally. No crackles, wheezes, rhonchi, or rales.  Cardiovascular:  Regular rate and rhythm, normal S1 and S2. No murmur, rub, or gallop.  Skin: Warm, dry. No rashes, cyanosis, or xanthelasma.  Extremities: Right thigh and buttock hematoma improving  Neurologic: No gross motor deficits. Alert, awake, and oriented to person, place and time.  Psychiatric: Affect appropriate.             Medications:     Current Outpatient Medications   Medication Sig Dispense Refill     albuterol (PROVENTIL HFA: VENTOLIN HFA) 108 (90 BASE) MCG/ACT inhaler Inhale 2 puffs into the lungs every 6 hours as needed for shortness of breath / dyspnea. 1 Inhaler 6     aspirin (ASA) 81 MG EC tablet Take 1 tablet by mouth daily       atorvastatin (LIPITOR) 40 MG tablet Take 1 tablet (40 mg) by mouth daily 90 tablet 3     azelastine (ASTELIN) 137 MCG/SPRAY nasal spray [place] 1 spray into both nostrils 2 times daily       clopidogrel (PLAVIX) 75 MG tablet Take 1 tablet (75 mg) by mouth daily Take 300mg (four 75mg tablets) day 1 then 75mg everyday thereafter. 94 tablet 3     famotidine (PEPCID) 20 MG tablet Take 20 mg by mouth 2 times daily       FLUTICASONE PROPIONATE, NASAL, NA Instill 1 spray in both nostrils twice daily       fluticasone-vilanterol (BREO ELLIPTA) 100-25 MCG/ACT inhaler Inhale 1 puff into the lungs daily as needed       losartan (COZAAR) 50 MG tablet Take 1 tablet (50 mg) by mouth At Bedtime Resume once your systolic blood pressure is 130 or above. 90 tablet 0     niacin 500 MG tablet Take 500 mg by mouth daily       nitroGLYcerin (NITROSTAT) 0.4 MG sublingual tablet For chest pain place 1 tablet under the tongue every 5 minutes for 3 doses. If symptoms persist 5 minutes after 2nd dose call 911. 30 tablet 11      triamcinolone (KENALOG) 0.1 % ointment APPLY SPARINGLY TO HANDS TWICE DAILY AS NEEDED TO CONTROL CONDITION. 30 g 0       Family History   Problem Relation Age of Onset     Hypertension Mother         alive age 73 arthritis     Heart Disease Father         alive age 73     Musculoskeletal Disorder Sister          age 42 ALS     Neurologic Disorder Sister         alive age 43 migranes     Family History Negative Sister         alive age 41     Breast Cancer Paternal Aunt              Breast Cancer Paternal Aunt              Breast Cancer Paternal Aunt              Diabetes Maternal Aunt          late 20's       Social History     Socioeconomic History     Marital status:      Spouse name: Jeannette     Number of children: 3     Years of education: Not on file     Highest education level: Not on file   Occupational History     Occupation: clinical research manager     Comment: advanced [employer]   Tobacco Use     Smoking status: Never     Smokeless tobacco: Never   Vaping Use     Vaping Use: Never used   Substance and Sexual Activity     Alcohol use: Yes     Comment: occasional     Drug use: No     Sexual activity: Yes     Partners: Female   Other Topics Concern     Not on file   Social History Narrative     Not on file     Social Determinants of Health     Financial Resource Strain: Not on file   Food Insecurity: Not on file   Transportation Needs: Not on file   Physical Activity: Not on file   Stress: Not on file   Social Connections: Not on file   Intimate Partner Violence: Not on file   Housing Stability: Not on file            Past Medical History:     Past Medical History:   Diagnosis Date     Unspecified asthma(493.90) 1963              Past Surgical History:     Past Surgical History:   Procedure Laterality Date     CV CORONARY ANGIOGRAM N/A 2022    Procedure: Coronary Angiogram;  Surgeon: Xavier Alonso MD;  Location:  HEART CARDIAC CATH LAB     CV  FRACTIONAL FLOW RATIO WIRE N/A 12/12/2022    Procedure: Fractional Flow Ratio Wire;  Surgeon: Xavier Alonso MD;  Location:  HEART CARDIAC CATH LAB     CV INSTANTANEOUS WAVE-FREE RATIO N/A 12/12/2022    Procedure: Instantaneous Wave-Free Ratio;  Surgeon: Xavier Alonso MD;  Location:  HEART CARDIAC CATH LAB     CV INTRAVASULAR ULTRASOUND N/A 12/12/2022    Procedure: Intravascular Ultrasound;  Surgeon: Xavier Alonso MD;  Location:  HEART CARDIAC CATH LAB     CV PCI STENT DRUG ELUTING N/A 12/12/2022    Procedure: Percutaneous Coronary Intervention Stent;  Surgeon: Xavier Alonso MD;  Location:  HEART CARDIAC CATH LAB     ZZC NONSPECIFIC PROCEDURE  1985    ORIF L middle finger              Allergies:   Erythromycin       Data:   All laboratory data reviewed:    Recent Labs   Lab Test 11/23/22  1000   TRIG 55       Lab Results   Component Value Date    WBC 6.7 02/19/2023    WBC 6.1 10/19/2020    RBC 3.10 (L) 02/19/2023    RBC 4.82 10/19/2020    HGB 8.9 (L) 02/20/2023    HGB 14.8 10/19/2020    HCT 28.8 (L) 02/19/2023    HCT 44.1 10/19/2020    MCV 93 02/19/2023    MCV 92 10/19/2020    MCH 31.3 02/19/2023    MCH 30.7 10/19/2020    MCHC 33.7 02/19/2023    MCHC 33.6 10/19/2020    RDW 13.7 02/19/2023    RDW 13.9 10/19/2020     02/19/2023     10/19/2020       Lab Results   Component Value Date     02/20/2023     10/19/2020    POTASSIUM 4.2 02/20/2023    POTASSIUM 4.3 12/12/2022    POTASSIUM 3.6 10/19/2020    CHLORIDE 109 (H) 02/20/2023    CHLORIDE 109 12/12/2022    CHLORIDE 102 11/23/2022    CHLORIDE 100 10/19/2020    CO2 25 02/20/2023    CO2 27 12/12/2022    CO2 27 10/19/2020    ANIONGAP 7 02/20/2023    ANIONGAP 5 12/12/2022    ANIONGAP 7 10/19/2020     (H) 02/20/2023     (H) 02/18/2023    GLC 99 12/12/2022    GLC 98 10/19/2020    BUN 12.5 02/20/2023    BUN 15 12/12/2022    BUN 9 10/19/2020    CR 0.84 02/20/2023    CR 0.90 10/19/2020     GFRESTIMATED >90 02/20/2023    GFRESTIMATED >90 10/19/2020    GFRESTBLACK >90 10/19/2020    KAI 8.5 (L) 02/20/2023    KAI 8.7 10/19/2020      Lab Results   Component Value Date    AST 31 02/18/2023     (H) 10/19/2020    ALT 38 02/18/2023     (H) 10/19/2020       No results found for: A1C    Lab Results   Component Value Date    INR 1.04 12/12/2022    INR 1.00 08/08/2005       Thank you for allowing me to participate in the care of your patient.    Sincerely,     JORGE Scherer [facility]

## 2023-03-24 NOTE — PROGRESS NOTES
General Cardiology Clinic Progress Note  Timothy Balderas MRN# [id number]   YOB: 1960 Age: 62 year old     Primary cardiologist: Dr. Garcia    Reason for visit: Three month follow up     History of presenting illness:    Timothy Balderas, a pleasant 62 year old patient who has a past medical history significant for family history of premature CAD with his father having coronary disease in his early 50s and undergoing bypass, hyperlipidemia and previously noted coronary calcifications on CT scan.    The patient was seen in consultation by Dr. Garcia on 12/2/2022 due to exertional chest discomfort and an abnormal stress EKG.  There was 2 mm horizontal ST depression in inferior leads and lateral precordial leads and 1 mm ST elevation in lead aVR.  There was no inducible ischemia on the echocardiogram portion.  The patient and Dr. Garcia discussed proceeding with a CT coronary angiogram versus an invasive coronary angiogram and it was elected to proceed with the latter.  He was started on metoprolol 25 mg twice daily and his prior Lipitor was increased from 10 mg daily to 40 mg daily.    The angiogram was completed on 12/12/2022 and he was found to have single-vessel disease of the proximal to mid LAD that was hemodynamically significant (iFR 0.89 and FFR of 0.78).  There was mild nonobstructive disease elsewhere.  The proximal mid LAD was treated with 1 drug-eluting stent.  He was placed on DAPT with aspirin and ticagrelor for total of 12 months and would consider indefinite P2Y 12 inhibitor monotherapy afterwards.  He was transitioned from ticagrelor to clopidogrel due to cost.      Unfortunately, the patient was admitted [facility] on 2/18 after he had a traumatic thigh hematoma while skiing.  He presented to the ED with discomfort and became lightheaded with blurry vision and found to have a hemoglobin drop.  He was given an IV fluid bolus and his CT of the pelvis noted a large hematoma  along the right gluteal muscle in the upper quadrant without active bleeding and fracture.  Initially DAPT was placed on hold and cardiology was consulted.  He was evaluated by Dr. Boswell who recommended resumption of DAPT given age and location of the stent and discussed risk benefit of holding versus continuing with the patient.  The patient was advised to avoid physical activities as long as he is on DAPT.  During the admission metoprolol and losartan were discontinued due to symptomatic hypotension.  The patient has resumed losartan, but metoprolol remains on hold.  His BP is well controlled today and his heart rate is 73 bpm.  He tells me that when previously on metoprolol he was unable to get his heart rate up with activity and had persistently cold fingers.    Today Timothy returns for a 3-month follow-up.  He continues physical therapy for his hematoma and has put cardiac rehab on hold at this time.  Overall, he is progressing with his recovery from the hematoma and is eager to complete his physical therapy and return to cardiac rehab.  His last hemoglobin within our system was 8.9 (initially 14.1), and he tells me that his primary care provider checked it at his follow-up visit.  Unfortunately, the results are not available to us in care everywhere and we will request the records.    Diagnostic studies:    Stress echocardiogram (11/30/2022): Echocardiogram was negative for ischemia however ECG portion was positive for ischemic changes.    Coronary angiogram (12/12/2022): 30% proximal LAD, 40% mid LAD (lesion 1), 60% mid LAD (lesion 2) that was 30% stenosed into a sidebranch of the third diagonal.  The second LAD lesion was hemodynamically significant (iFR 0.89 and FFR 0.78) and was treated with a drug-eluting stent.  OM was 40% stenosed          Assessment and Plan:     ASSESSMENT:    1. Coronary artery disease    Status post drug-eluting stent to the LAD with mild nonobstructive disease elsewhere    DAPT with  "aspirin and Plavix that was interrupted briefly during a hospitalization in February for a hematoma posterior traumatic injury.    Last LDL 77 (11/23/2022) on atorvastatin 40 mg daily    2. Hypertension    Well controlled     Losartan 50 mg daily    PLAN:     1. At this time we will remain off metoprolol given low resting heart rate and difficulty with getting heart rate elevated with activity.  2. Recommend returning to cardiac rehab when physically able   3. Follow-up with Dr. Garcia or KRISTEN in December 2023 to discuss discontinuation of clopidogrel       Orders this Visit:  Orders Placed This Encounter   Procedures     Follow-Up with Cardiology     No orders of the defined types were placed in this encounter.    There are no discontinued medications.    Today's clinic visit entailed:  Review of the result(s) of each unique test - SE, cath  Prescription drug management  20 minutes spent on the date of the encounter doing chart review, history and exam, documentation and further activities per the note  Provider  Link to [facility] Help Grid     The level of medical decision making during this visit was of moderate complexity.           Review of Systems:     Review of Systems:  Skin:  not assessed     Eyes:  not assessed    ENT:  not assessed    Respiratory:  Negative    Cardiovascular:  Negative    Gastroenterology: not assessed    Genitourinary:  not assessed    Musculoskeletal:  not assessed    Neurologic:  not assessed    Psychiatric:  not assessed    Heme/Lymph/Imm:  not assessed    Endocrine:  Negative              Physical Exam:     Vitals: /82 (Cuff Size: Adult Regular)   Pulse 73   Ht 1.715 m (5' 7.52\")   Wt 80 kg (176 lb 6.4 oz)   SpO2 98%   BMI 27.20 kg/m    Constitutional: Well nourished and in no apparent distress.  Eyes: Pupils equal, round. Sclerae anicteric.   HEENT: Normocephalic, atraumatic.   Neck: Supple. JVD   Respiratory: Breathing non-labored. Lungs clear to auscultation bilaterally. No " crackles, wheezes, rhonchi, or rales.  Cardiovascular:  Regular rate and rhythm, normal S1 and S2. No murmur, rub, or gallop.  Skin: Warm, dry. No rashes, cyanosis, or xanthelasma.  Extremities: Right thigh and buttock hematoma improving  Neurologic: No gross motor deficits. Alert, awake, and oriented to person, place and time.  Psychiatric: Affect appropriate.             Medications:     Current Outpatient Medications   Medication Sig Dispense Refill     albuterol (PROVENTIL HFA: VENTOLIN HFA) 108 (90 BASE) MCG/ACT inhaler Inhale 2 puffs into the lungs every 6 hours as needed for shortness of breath / dyspnea. 1 Inhaler 6     aspirin (ASA) 81 MG EC tablet Take 1 tablet by mouth daily       atorvastatin (LIPITOR) 40 MG tablet Take 1 tablet (40 mg) by mouth daily 90 tablet 3     azelastine (ASTELIN) 137 MCG/SPRAY nasal spray [place] 1 spray into both nostrils 2 times daily       clopidogrel (PLAVIX) 75 MG tablet Take 1 tablet (75 mg) by mouth daily Take 300mg (four 75mg tablets) day 1 then 75mg everyday thereafter. 94 tablet 3     famotidine (PEPCID) 20 MG tablet Take 20 mg by mouth 2 times daily       FLUTICASONE PROPIONATE, NASAL, NA Instill 1 spray in both nostrils twice daily       fluticasone-vilanterol (BREO ELLIPTA) 100-25 MCG/ACT inhaler Inhale 1 puff into the lungs daily as needed       losartan (COZAAR) 50 MG tablet Take 1 tablet (50 mg) by mouth At Bedtime Resume once your systolic blood pressure is 130 or above. 90 tablet 0     niacin 500 MG tablet Take 500 mg by mouth daily       nitroGLYcerin (NITROSTAT) 0.4 MG sublingual tablet For chest pain place 1 tablet under the tongue every 5 minutes for 3 doses. If symptoms persist 5 minutes after 2nd dose call 911. 30 tablet 11     triamcinolone (KENALOG) 0.1 % ointment APPLY SPARINGLY TO HANDS TWICE DAILY AS NEEDED TO CONTROL CONDITION. 30 g 0       Family History   Problem Relation Age of Onset     Hypertension Mother         alive age 73 arthritis     Heart  Disease Father         alive age 73     Musculoskeletal Disorder Sister          age 42 ALS     Neurologic Disorder Sister         alive age 43 migranes     Family History Negative Sister         alive age 41     Breast Cancer Paternal Aunt              Breast Cancer Paternal Aunt              Breast Cancer Paternal Aunt              Diabetes Maternal Aunt          late 20's       Social History     Socioeconomic History     Marital status:      Spouse name: Jeannette     Number of children: 3     Years of education: Not on file     Highest education level: Not on file   Occupational History     Occupation: clinical research manager     Comment: advanced [employer]   Tobacco Use     Smoking status: Never     Smokeless tobacco: Never   Vaping Use     Vaping Use: Never used   Substance and Sexual Activity     Alcohol use: Yes     Comment: occasional     Drug use: No     Sexual activity: Yes     Partners: Female   Other Topics Concern     Not on file   Social History Narrative     Not on file     Social Determinants of Health     Financial Resource Strain: Not on file   Food Insecurity: Not on file   Transportation Needs: Not on file   Physical Activity: Not on file   Stress: Not on file   Social Connections: Not on file   Intimate Partner Violence: Not on file   Housing Stability: Not on file            Past Medical History:     Past Medical History:   Diagnosis Date     Unspecified asthma(493.90) 1963              Past Surgical History:     Past Surgical History:   Procedure Laterality Date     CV CORONARY ANGIOGRAM N/A 2022    Procedure: Coronary Angiogram;  Surgeon: Xavier Alonso MD;  Location: [facility] CARDIAC CATH LAB     CV FRACTIONAL FLOW RATIO WIRE N/A 2022    Procedure: Fractional Flow Ratio Wire;  Surgeon: Xavier Alonso MD;  Location: [facility] CARDIAC CATH LAB     CV INSTANTANEOUS WAVE-FREE RATIO N/A 2022    Procedure:  Instantaneous Wave-Free Ratio;  Surgeon: Xavier Alonso MD;  Location:  HEART CARDIAC CATH LAB     CV INTRAVASULAR ULTRASOUND N/A 12/12/2022    Procedure: Intravascular Ultrasound;  Surgeon: Xavier Alonso MD;  Location:  HEART CARDIAC CATH LAB     CV PCI STENT DRUG ELUTING N/A 12/12/2022    Procedure: Percutaneous Coronary Intervention Stent;  Surgeon: Xavier Alonso MD;  Location:  HEART CARDIAC CATH LAB     ZZC NONSPECIFIC PROCEDURE  1985    ORIF L middle finger              Allergies:   Erythromycin       Data:   All laboratory data reviewed:    Recent Labs   Lab Test 11/23/22  1000   TRIG 55       Lab Results   Component Value Date    WBC 6.7 02/19/2023    WBC 6.1 10/19/2020    RBC 3.10 (L) 02/19/2023    RBC 4.82 10/19/2020    HGB 8.9 (L) 02/20/2023    HGB 14.8 10/19/2020    HCT 28.8 (L) 02/19/2023    HCT 44.1 10/19/2020    MCV 93 02/19/2023    MCV 92 10/19/2020    MCH 31.3 02/19/2023    MCH 30.7 10/19/2020    MCHC 33.7 02/19/2023    MCHC 33.6 10/19/2020    RDW 13.7 02/19/2023    RDW 13.9 10/19/2020     02/19/2023     10/19/2020       Lab Results   Component Value Date     02/20/2023     10/19/2020    POTASSIUM 4.2 02/20/2023    POTASSIUM 4.3 12/12/2022    POTASSIUM 3.6 10/19/2020    CHLORIDE 109 (H) 02/20/2023    CHLORIDE 109 12/12/2022    CHLORIDE 102 11/23/2022    CHLORIDE 100 10/19/2020    CO2 25 02/20/2023    CO2 27 12/12/2022    CO2 27 10/19/2020    ANIONGAP 7 02/20/2023    ANIONGAP 5 12/12/2022    ANIONGAP 7 10/19/2020     (H) 02/20/2023     (H) 02/18/2023    GLC 99 12/12/2022    GLC 98 10/19/2020    BUN 12.5 02/20/2023    BUN 15 12/12/2022    BUN 9 10/19/2020    CR 0.84 02/20/2023    CR 0.90 10/19/2020    GFRESTIMATED >90 02/20/2023    GFRESTIMATED >90 10/19/2020    GFRESTBLACK >90 10/19/2020    KAI 8.5 (L) 02/20/2023    KAI 8.7 10/19/2020      Lab Results   Component Value Date    AST 31 02/18/2023     (H) 10/19/2020    ALT  38 02/18/2023     (H) 10/19/2020       No results found for: A1C    Lab Results   Component Value Date    INR 1.04 12/12/2022    INR 1.00 08/08/2005         JORGE MCALLISTER [facility]  Pager: [phone]  RN phone: [phone]

## 2023-03-24 NOTE — PATIENT INSTRUCTIONS
Today's Recommendations    Continue all medications without changes.  Please follow up with Dr. Garcia in December.    Please send a [facility] message or call [phone] to the RN team with questions or concerns.     Scheduling number [phone]    JORGE Pineda, CNP

## 2023-03-26 ENCOUNTER — HEALTH MAINTENANCE LETTER (OUTPATIENT)
Age: 63
End: 2023-03-26

## 2023-03-27 ENCOUNTER — TELEPHONE (OUTPATIENT)
Dept: CARDIOLOGY | Facility: CLINIC | Age: 63
End: 2023-03-27
Payer: COMMERCIAL

## 2023-03-27 NOTE — TELEPHONE ENCOUNTER
RN received fax from [facility] with patient's CBC results from 2/28/23. RN will send to KRISTEN Dedra or review.       ----- Message from JORGE Pineda CNP sent at 3/24/2023  8:27 AM CDT -----  Can you please get records for [facility]? I am specifically wishing for his CBC.     JORGE Pineda, CNP    2/28/23 CBC

## 2023-05-31 ENCOUNTER — TELEPHONE (OUTPATIENT)
Dept: CARDIOLOGY | Facility: CLINIC | Age: 63
End: 2023-05-31

## 2023-05-31 DIAGNOSIS — Z98.890 STATUS POST CORONARY ANGIOGRAM: ICD-10-CM

## 2023-05-31 RX ORDER — LOSARTAN POTASSIUM 50 MG/1
50 TABLET ORAL AT BEDTIME
Qty: 90 TABLET | Refills: 1 | Status: SHIPPED | OUTPATIENT
Start: 2023-05-31 | End: 2023-11-21

## 2023-05-31 NOTE — TELEPHONE ENCOUNTER
[facility] Call Center    Phone Message    May a detailed message be left on voicemail: yes     Reason for Call: Medication Refill Request    Has the patient contacted the pharmacy for the refill? Yes   Name of medication being requested: losartan (COZAAR) 50 MG tablet [[id number]]    Provider who prescribed the medication: Dedra Jerry  Pharmacy: [facility] [id number] [address]    Date medication is needed: 6/2/2023      Action Taken: Other: Cardiology    Travel Screening: Not Applicable     Thank you!  [facility]

## 2023-06-12 ENCOUNTER — TELEPHONE (OUTPATIENT)
Dept: CARDIOLOGY | Facility: CLINIC | Age: 63
End: 2023-06-12

## 2023-06-12 NOTE — TELEPHONE ENCOUNTER
[facility] Call Center    Phone Message    May a detailed message be left on voicemail: yes     Reason for Call: Other: Patient called to cancel his appt that is for today at 9:30 AM. Please cancel and call patient back to reschedule, thank you.     Action Taken: Message routed to:  Other: Cardiology    Travel Screening: Not Applicable     Thank you!  [facility]

## 2023-08-10 ENCOUNTER — OFFICE VISIT (OUTPATIENT)
Dept: URGENT CARE | Facility: URGENT CARE | Age: 63
End: 2023-08-10
Payer: COMMERCIAL

## 2023-08-10 VITALS
WEIGHT: 170 LBS | DIASTOLIC BLOOD PRESSURE: 95 MMHG | OXYGEN SATURATION: 98 % | BODY MASS INDEX: 26.22 KG/M2 | SYSTOLIC BLOOD PRESSURE: 168 MMHG | TEMPERATURE: 98 F | RESPIRATION RATE: 18 BRPM | HEART RATE: 80 BPM

## 2023-08-10 DIAGNOSIS — N30.01 ACUTE CYSTITIS WITH HEMATURIA: Primary | ICD-10-CM

## 2023-08-10 DIAGNOSIS — I10 ESSENTIAL HYPERTENSION: ICD-10-CM

## 2023-08-10 DIAGNOSIS — R39.89 ABNORMAL URINE COLOR: ICD-10-CM

## 2023-08-10 LAB
ALBUMIN UR-MCNC: NEGATIVE MG/DL
APPEARANCE UR: ABNORMAL
BACTERIA #/AREA URNS HPF: ABNORMAL /HPF
BILIRUB UR QL STRIP: NEGATIVE
COLOR UR AUTO: YELLOW
GLUCOSE UR STRIP-MCNC: NEGATIVE MG/DL
HGB UR QL STRIP: ABNORMAL
KETONES UR STRIP-MCNC: NEGATIVE MG/DL
LEUKOCYTE ESTERASE UR QL STRIP: NEGATIVE
NITRATE UR QL: NEGATIVE
PH UR STRIP: 5.5 [PH] (ref 5–7)
RBC #/AREA URNS AUTO: ABNORMAL /HPF
SP GR UR STRIP: 1.02 (ref 1–1.03)
SQUAMOUS #/AREA URNS AUTO: ABNORMAL /LPF
UROBILINOGEN UR STRIP-ACNC: 0.2 E.U./DL
WBC #/AREA URNS AUTO: ABNORMAL /HPF

## 2023-08-10 PROCEDURE — 81001 URINALYSIS AUTO W/SCOPE: CPT | Performed by: NURSE PRACTITIONER

## 2023-08-10 PROCEDURE — 87086 URINE CULTURE/COLONY COUNT: CPT | Performed by: NURSE PRACTITIONER

## 2023-08-10 PROCEDURE — 99204 OFFICE O/P NEW MOD 45 MIN: CPT | Performed by: NURSE PRACTITIONER

## 2023-08-10 RX ORDER — NITROFURANTOIN 25; 75 MG/1; MG/1
100 CAPSULE ORAL 2 TIMES DAILY
Qty: 10 CAPSULE | Refills: 0 | Status: SHIPPED | OUTPATIENT
Start: 2023-08-10 | End: 2023-08-15

## 2023-08-10 NOTE — PROGRESS NOTES
Chief Complaint   Patient presents with    Urinary Problem     Blood in urine started 2 days ago            ICD-10-CM    1. Acute cystitis with hematuria  N30.01 nitroFURantoin macrocrystal-monohydrate (MACROBID) 100 MG capsule      2. Essential hypertension  I10       3. Abnormal urine color  R39.89 UA Macroscopic with reflex to Microscopic and Culture - Clinic Collect     UA Microscopic with Reflex to Culture     Urine Culture      Antibiotics, increase water intake.  Recheck in 1 week if any symptoms remain.  If he develops fever, chills, nausea vomiting or low back pain he is instructed go to the emergency room.    Blood pressure is elevated today.  He reports it is always elevated when he comes to the doctor.  He does check it regularly at home and it usually runs within normal limits.  Recommend he recheck the blood pressure at home today to be sure it has come down, continue to take his medications as prescribed and follow-up with primary care provider as previously advised.    Results for orders placed or performed in visit on 08/10/23 (from the past 24 hour(s))   UA Macroscopic with reflex to Microscopic and Culture - Clinic Collect    Specimen: Urine, Clean Catch   Result Value Ref Range    Color Urine Yellow Colorless, Straw, Light Yellow, Yellow    Appearance Urine Slightly Cloudy (A) Clear    Glucose Urine Negative Negative mg/dL    Bilirubin Urine Negative Negative    Ketones Urine Negative Negative mg/dL    Specific Gravity Urine 1.025 1.003 - 1.035    Blood Urine Large (A) Negative    pH Urine 5.5 5.0 - 7.0    Protein Albumin Urine Negative Negative mg/dL    Urobilinogen Urine 0.2 0.2, 1.0 E.U./dL    Nitrite Urine Negative Negative    Leukocyte Esterase Urine Negative Negative   UA Microscopic with Reflex to Culture   Result Value Ref Range    Bacteria Urine Many (A) None Seen /HPF    RBC Urine 25-50 (A) 0-2 /HPF /HPF    WBC Urine 10-25 (A) 0-5 /HPF /HPF    Squamous Epithelials Urine Moderate (A)  None Seen /LPF       Subjective     Timothy Balderas is an 62 year old male who presents to clinic today for discoloration of urine that started 2 days ago.  He thinks there may be blood present.    He denies any trauma, fever, chills, nausea, vomiting, dysuria, urinary frequency, low back pain.      Objective    BP (!) 168/95   Pulse 80   Temp 98  F (36.7  C) (Tympanic)   Resp 18   Wt 77.1 kg (170 lb)   SpO2 98%   BMI 26.22 kg/m    Nurses notes and VS have been reviewed.    Physical Exam       GENERAL APPEARANCE: healthy appearing, alert     ABDOMEN:  soft, nontender, no HSM or masses and bowel sounds normal, no CVA tenderness     MS: extremities normal- no gross deformities noted; normal muscle tone.     SKIN: no suspicious lesions or rashes     NEURO: Normal strength and tone, mentation intact and speech normal      JORGE Umaña, CNP  [facility] Provider    The use of Dragon/ePatientFinder dictation services may have been used to construct the content in this note; any grammatical or spelling errors are non-intentional. Please contact the author of this note directly if you are in need of any clarification.

## 2023-08-11 PROBLEM — I82.409 DEEP VEIN THROMBOSIS (DVT) OF LOWER EXTREMITY (H): Status: ACTIVE | Noted: 2023-08-11

## 2023-08-11 PROBLEM — J45.998 OTHER ASTHMA: Status: ACTIVE | Noted: 2023-08-11

## 2023-08-12 LAB — BACTERIA UR CULT: NO GROWTH

## 2023-08-20 ENCOUNTER — HOSPITAL ENCOUNTER (EMERGENCY)
Facility: CLINIC | Age: 63
Discharge: HOME | End: 2023-08-20
Attending: EMERGENCY MEDICINE | Admitting: EMERGENCY MEDICINE
Payer: COMMERCIAL

## 2023-08-20 ENCOUNTER — APPOINTMENT (OUTPATIENT)
Dept: CT IMAGING | Facility: CLINIC | Age: 63
End: 2023-08-20
Attending: EMERGENCY MEDICINE
Payer: COMMERCIAL

## 2023-08-20 VITALS
RESPIRATION RATE: 12 BRPM | DIASTOLIC BLOOD PRESSURE: 84 MMHG | TEMPERATURE: 97.4 F | OXYGEN SATURATION: 96 % | WEIGHT: 170 LBS | HEIGHT: 67 IN | HEART RATE: 51 BPM | BODY MASS INDEX: 26.68 KG/M2 | SYSTOLIC BLOOD PRESSURE: 174 MMHG

## 2023-08-20 DIAGNOSIS — N20.1 LEFT URETERAL STONE: ICD-10-CM

## 2023-08-20 DIAGNOSIS — N23 RENAL COLIC ON LEFT SIDE: ICD-10-CM

## 2023-08-20 LAB
ALBUMIN SERPL BCG-MCNC: 4.1 G/DL (ref 3.5–5.2)
ALBUMIN UR-MCNC: 70 MG/DL
ALP SERPL-CCNC: 59 U/L (ref 40–129)
ALT SERPL W P-5'-P-CCNC: 29 U/L (ref 0–70)
ANION GAP SERPL CALCULATED.3IONS-SCNC: 12 MMOL/L (ref 7–15)
APPEARANCE UR: ABNORMAL
AST SERPL W P-5'-P-CCNC: 28 U/L (ref 0–45)
BASOPHILS # BLD AUTO: 0 10E3/UL (ref 0–0.2)
BASOPHILS NFR BLD AUTO: 0 %
BILIRUB SERPL-MCNC: 0.5 MG/DL
BILIRUB UR QL STRIP: NEGATIVE
BUN SERPL-MCNC: 18.2 MG/DL (ref 8–23)
CALCIUM SERPL-MCNC: 8.8 MG/DL (ref 8.8–10.2)
CHLORIDE SERPL-SCNC: 106 MMOL/L (ref 98–107)
COLOR UR AUTO: ABNORMAL
CREAT SERPL-MCNC: 1.07 MG/DL (ref 0.67–1.17)
DEPRECATED HCO3 PLAS-SCNC: 24 MMOL/L (ref 22–29)
EOSINOPHIL # BLD AUTO: 0.1 10E3/UL (ref 0–0.7)
EOSINOPHIL NFR BLD AUTO: 1 %
ERYTHROCYTE [DISTWIDTH] IN BLOOD BY AUTOMATED COUNT: 13.9 % (ref 10–15)
GFR SERPL CREATININE-BSD FRML MDRD: 77 ML/MIN/1.73M2
GLUCOSE SERPL-MCNC: 125 MG/DL (ref 70–99)
GLUCOSE UR STRIP-MCNC: NEGATIVE MG/DL
HCT VFR BLD AUTO: 42.3 % (ref 40–53)
HGB BLD-MCNC: 14.1 G/DL (ref 13.3–17.7)
HGB UR QL STRIP: ABNORMAL
IMM GRANULOCYTES # BLD: 0 10E3/UL
IMM GRANULOCYTES NFR BLD: 0 %
KETONES UR STRIP-MCNC: NEGATIVE MG/DL
LEUKOCYTE ESTERASE UR QL STRIP: NEGATIVE
LYMPHOCYTES # BLD AUTO: 0.5 10E3/UL (ref 0.8–5.3)
LYMPHOCYTES NFR BLD AUTO: 5 %
MCH RBC QN AUTO: 31.5 PG (ref 26.5–33)
MCHC RBC AUTO-ENTMCNC: 33.3 G/DL (ref 31.5–36.5)
MCV RBC AUTO: 95 FL (ref 78–100)
MONOCYTES # BLD AUTO: 0.5 10E3/UL (ref 0–1.3)
MONOCYTES NFR BLD AUTO: 5 %
MUCOUS THREADS #/AREA URNS LPF: PRESENT /LPF
NEUTROPHILS # BLD AUTO: 8.4 10E3/UL (ref 1.6–8.3)
NEUTROPHILS NFR BLD AUTO: 89 %
NITRATE UR QL: NEGATIVE
NRBC # BLD AUTO: 0 10E3/UL
NRBC BLD AUTO-RTO: 0 /100
PH UR STRIP: 5.5 [PH] (ref 5–7)
PLATELET # BLD AUTO: 344 10E3/UL (ref 150–450)
POTASSIUM SERPL-SCNC: 4.2 MMOL/L (ref 3.4–5.3)
PROT SERPL-MCNC: 6.6 G/DL (ref 6.4–8.3)
RBC # BLD AUTO: 4.47 10E6/UL (ref 4.4–5.9)
RBC URINE: >182 /HPF
SODIUM SERPL-SCNC: 142 MMOL/L (ref 136–145)
SP GR UR STRIP: 1.03 (ref 1–1.03)
SQUAMOUS EPITHELIAL: <1 /HPF
UROBILINOGEN UR STRIP-MCNC: NORMAL MG/DL
WBC # BLD AUTO: 9.6 10E3/UL (ref 4–11)
WBC URINE: 18 /HPF

## 2023-08-20 PROCEDURE — 96374 THER/PROPH/DIAG INJ IV PUSH: CPT

## 2023-08-20 PROCEDURE — 99285 EMERGENCY DEPT VISIT HI MDM: CPT | Mod: 25

## 2023-08-20 PROCEDURE — 87086 URINE CULTURE/COLONY COUNT: CPT | Performed by: EMERGENCY MEDICINE

## 2023-08-20 PROCEDURE — 74176 CT ABD & PELVIS W/O CONTRAST: CPT

## 2023-08-20 PROCEDURE — 80053 COMPREHEN METABOLIC PANEL: CPT | Performed by: EMERGENCY MEDICINE

## 2023-08-20 PROCEDURE — 96361 HYDRATE IV INFUSION ADD-ON: CPT

## 2023-08-20 PROCEDURE — 258N000003 HC RX IP 258 OP 636: Performed by: EMERGENCY MEDICINE

## 2023-08-20 PROCEDURE — 81001 URINALYSIS AUTO W/SCOPE: CPT | Performed by: EMERGENCY MEDICINE

## 2023-08-20 PROCEDURE — 36415 COLL VENOUS BLD VENIPUNCTURE: CPT | Performed by: EMERGENCY MEDICINE

## 2023-08-20 PROCEDURE — 85025 COMPLETE CBC W/AUTO DIFF WBC: CPT | Performed by: EMERGENCY MEDICINE

## 2023-08-20 PROCEDURE — 96375 TX/PRO/DX INJ NEW DRUG ADDON: CPT

## 2023-08-20 PROCEDURE — 250N000011 HC RX IP 250 OP 636: Mod: JZ | Performed by: EMERGENCY MEDICINE

## 2023-08-20 RX ORDER — HYDROCODONE BITARTRATE AND ACETAMINOPHEN 5; 325 MG/1; MG/1
1 TABLET ORAL EVERY 6 HOURS PRN
Qty: 18 TABLET | Refills: 0 | Status: SHIPPED | OUTPATIENT
Start: 2023-08-20 | End: 2023-08-25

## 2023-08-20 RX ORDER — MORPHINE SULFATE 4 MG/ML
4 INJECTION, SOLUTION INTRAMUSCULAR; INTRAVENOUS
Status: DISCONTINUED | OUTPATIENT
Start: 2023-08-20 | End: 2023-08-20 | Stop reason: HOSPADM

## 2023-08-20 RX ORDER — ONDANSETRON 2 MG/ML
4 INJECTION INTRAMUSCULAR; INTRAVENOUS ONCE
Status: COMPLETED | OUTPATIENT
Start: 2023-08-20 | End: 2023-08-20

## 2023-08-20 RX ORDER — TAMSULOSIN HYDROCHLORIDE 0.4 MG/1
0.4 CAPSULE ORAL DAILY
Qty: 10 CAPSULE | Refills: 0 | Status: SHIPPED | OUTPATIENT
Start: 2023-08-20 | End: 2023-08-30

## 2023-08-20 RX ORDER — KETOROLAC TROMETHAMINE 15 MG/ML
10 INJECTION, SOLUTION INTRAMUSCULAR; INTRAVENOUS ONCE
Status: COMPLETED | OUTPATIENT
Start: 2023-08-20 | End: 2023-08-20

## 2023-08-20 RX ADMIN — KETOROLAC TROMETHAMINE 10 MG: 15 INJECTION, SOLUTION INTRAMUSCULAR; INTRAVENOUS at 10:13

## 2023-08-20 RX ADMIN — SODIUM CHLORIDE 1000 ML: 9 INJECTION, SOLUTION INTRAVENOUS at 08:58

## 2023-08-20 RX ADMIN — ONDANSETRON 4 MG: 2 INJECTION INTRAMUSCULAR; INTRAVENOUS at 08:58

## 2023-08-20 ASSESSMENT — ACTIVITIES OF DAILY LIVING (ADL)
ADLS_ACUITY_SCORE: 35
ADLS_ACUITY_SCORE: 35

## 2023-08-20 NOTE — DISCHARGE INSTRUCTIONS
If you have not heard from the urology clinic by the end of the day tomorrow, please call the direct scheduling line at [phone]/3 - 1/4/2004.    Return to the ER for worsening pain, fever, or any new concerns.    Do not drive, use machinery, use alcohol, use of the sedating medications, or using medication also contains acetaminophen (Tylenol) while taking Norco.

## 2023-08-20 NOTE — ED TRIAGE NOTES
A couple weeks of LLQ abdominal pain with hematuria. Patient began to have n/v today. Has CT scan scheduled this week to rule out kidney stone. Patient unable to control pain at home.      Triage Assessment       Row Name 08/20/23 0867       Triage Assessment (Adult)    Airway WDL WDL       Respiratory WDL    Respiratory WDL WDL       Skin Circulation/Temperature WDL    Skin Circulation/Temperature WDL WDL       Cardiac WDL    Cardiac WDL WDL       Peripheral/Neurovascular WDL    Peripheral Neurovascular WDL WDL       Cognitive/Neuro/Behavioral WDL    Cognitive/Neuro/Behavioral WDL WDL

## 2023-08-20 NOTE — ED PROVIDER NOTES
"  History     Chief Complaint:  Abdominal Pain, Hematuria, and Nausea & Vomiting       HPI   Timothy Balderas is a 62 year old male with a history of a-fib and CAD who presents with a sudden worsening LLQ abdominal pain upon waking up this morning. 3 weeks ago, he experienced slight lower abdominal pain that radiated into his testicle, and he described the pain as \"getting kicked.\" The pain lasted all night and then eventually subsided. This past week, he began to notice intermittent dark urine and hematuria. He was evaluated at  and was treated with antibiotics for possible UTI. His symptoms resolved while on the course, but returned once it was finished. He was seen at  again and was prescribed another antibiotic, but was seen by urology shortly afterwards which discontinued the antibiotic due to having no evidence for an infection. The pain has worsened this morning, and he took Tylenol, but mentions vomiting twice after taking it. He didn't notice the Tylenol in his vomit. He has never had kidney stones in the past. He denies recent falls or injury, strenuous or exacerbating activity, blood in stool, known bleeding elsewhere, or fevers.    Independent Historian:   The patient's wife provides additional history in regards to the progression of his symptoms over the last 3 weeks.    Review of External Notes:   Urgent care note reviewed from August 10, 2023 when the patient was diagnosed with cystitis and treated with Macrobid.      Medications:    Albuterol  Aspirin  Atorvastatin  Azelastine  Clopidogrel  Famotidine  Fluticasone  Losartan  Metoprolol tartrate  Niacin  Nitroglycerin     Past Medical History:    Asthma  CAD  Allergic rhinitis  Atrial fibrillation     Past Surgical History:    Coronary angiogram  Fractional flow ratio wire  Instantaneous wave-free ratio  Intravascular ultrasound  Percutaneous coronary intervention stent  ORIF L middle finger     Physical Exam   Patient Vitals for the past 24 hrs:   " "BP Temp Temp src Pulse Resp SpO2 Height Weight   08/20/23 0834 (!) 174/84 97.4  F (36.3  C) Temporal 51 12 96 % 1.702 m (5' 7\") 77.1 kg (170 lb)   08/20/23 0830 (!) 179/84 -- -- (!) 49 -- -- -- --        Physical Exam  Constitutional:       General: He is not in acute distress.     Appearance: Normal appearance. He is not toxic-appearing.   HENT:      Head: Atraumatic.      Right Ear: External ear normal.      Left Ear: External ear normal.      Nose: Nose normal.      Mouth/Throat:      Mouth: Mucous membranes are moist.   Eyes:      General: No scleral icterus.     Conjunctiva/sclera: Conjunctivae normal.   Cardiovascular:      Rate and Rhythm: Normal rate and regular rhythm.      Heart sounds: Normal heart sounds.   Pulmonary:      Effort: Pulmonary effort is normal. No respiratory distress.      Breath sounds: Normal breath sounds.   Abdominal:      Palpations: Abdomen is soft.      Tenderness: There is abdominal tenderness.      Comments: Mild left mid abdominal tenderness without guarding or rebound.   Genitourinary:     Comments: No CVA tenderness.  No left inguinal hernia.  Penis and scrotum are normal.  Musculoskeletal:         General: No deformity.      Cervical back: Neck supple.      Right lower leg: No edema.      Left lower leg: No edema.   Skin:     General: Skin is warm.      Capillary Refill: Capillary refill takes less than 2 seconds.   Neurological:      General: No focal deficit present.      Mental Status: He is alert and oriented to person, place, and time.   Psychiatric:         Mood and Affect: Mood normal.         Behavior: Behavior normal.           Emergency Department Course     Imaging:  CT Abdomen Pelvis w/o Contrast   Final Result   IMPRESSION:    1.  Mildly obstructing 5 x 3 mm stone mid left ureter.   2.  No other urinary calculi.            Report per radiology    Laboratory:  Labs Ordered and Resulted from Time of ED Arrival to Time of ED Departure   COMPREHENSIVE METABOLIC PANEL " - Abnormal       Result Value    Sodium 142      Potassium 4.2      Chloride 106      Carbon Dioxide (CO2) 24      Anion Gap 12      Urea Nitrogen 18.2      Creatinine 1.07      Calcium 8.8      Glucose 125 (*)     Alkaline Phosphatase 59      AST 28      ALT 29      Protein Total 6.6      Albumin 4.1      Bilirubin Total 0.5      GFR Estimate 77     ROUTINE UA WITH MICROSCOPIC REFLEX TO CULTURE - Abnormal    Color Urine Orange (*)     Appearance Urine Slightly Cloudy (*)     Glucose Urine Negative      Bilirubin Urine Negative      Ketones Urine Negative      Specific Gravity Urine 1.031      Blood Urine Large (*)     pH Urine 5.5      Protein Albumin Urine 70 (*)     Urobilinogen Urine Normal      Nitrite Urine Negative      Leukocyte Esterase Urine Negative      Mucus Urine Present (*)     RBC Urine >182 (*)     WBC Urine 18 (*)     Squamous Epithelials Urine <1     CBC WITH PLATELETS AND DIFFERENTIAL - Abnormal    WBC Count 9.6      RBC Count 4.47      Hemoglobin 14.1      Hematocrit 42.3      MCV 95      MCH 31.5      MCHC 33.3      RDW 13.9      Platelet Count 344      % Neutrophils 89      % Lymphocytes 5      % Monocytes 5      % Eosinophils 1      % Basophils 0      % Immature Granulocytes 0      NRBCs per 100 WBC 0      Absolute Neutrophils 8.4 (*)     Absolute Lymphocytes 0.5 (*)     Absolute Monocytes 0.5      Absolute Eosinophils 0.1      Absolute Basophils 0.0      Absolute Immature Granulocytes 0.0      Absolute NRBCs 0.0     URINE CULTURE        Emergency Department Course & Assessments:    Interventions:  Medications   morphine (PF) injection 4 mg (has no administration in time range)   0.9% sodium chloride BOLUS (1,000 mLs Intravenous $New Bag 8/20/23 0858)   ondansetron (ZOFRAN) injection 4 mg (4 mg Intravenous $Given 8/20/23 0858)   ketorolac (TORADOL) injection 10 mg (10 mg Intravenous $Given 8/20/23 1013)        Assessments:  0836 I obtained history and examined the patient, as noted  above.  1017 I rechecked and updated the patient.    Consultations/Discussion of Management or Tests:  1011 I spoke with Dr. Bass from urology about the patient's presentation, findings, and plan of care.      Disposition:  The patient was discharged to home.     Impression & Plan      Medical Decision Making:  This patient is a 62-year-old man who presents to the emergency department for evaluation of left abdominal and flank pain.  He is hemodynamically stable.  He does have a 5 x 3 mm mid left ureteral stone.  He has been seen by urology as an outpatient, Dr. Son.  Renal function looks good.  Urine does not appear to be infected.  His pain is well controlled.  I spoke with on-call urology.  We will increase his pain medication to Norco.  We will start Flomax.  He will be contacted by their  for cystoscopy this week.  We discussed return precautions.        Diagnosis:    ICD-10-CM    1. Renal colic on left side  N23       2. Left ureteral stone  N20.1            Discharge Medications:  New Prescriptions    HYDROCODONE-ACETAMINOPHEN (NORCO) 5-325 MG TABLET    Take 1 tablet by mouth every 6 hours as needed for pain    TAMSULOSIN (FLOMAX) 0.4 MG CAPSULE    Take 1 capsule (0.4 mg) by mouth daily for 10 doses        Scribe Disclosure:  I, Per Saunders, am serving as a scribe at 8:44 AM on 8/20/2023 to document services personally performed by Emerson Lam MD based on my observations and the provider's statements to me.        Emerson Lam MD  08/20/23 9558

## 2023-08-21 ENCOUNTER — TELEPHONE (OUTPATIENT)
Dept: CARDIOLOGY | Facility: CLINIC | Age: 63
End: 2023-08-21

## 2023-08-21 LAB — BACTERIA UR CULT: NO GROWTH

## 2023-08-21 NOTE — TELEPHONE ENCOUNTER
Patient wondering about 7 day Plavix hold for kidney stone removal.   Hx of  drug-eluting stent to the LAD with mild nonobstructive disease elsewhere 12/12/2022.     Will route to Dedra to review

## 2023-08-22 NOTE — TELEPHONE ENCOUNTER
Contacted patient to review Dedra's recommendations. Patient verbalized understanding and agreed with plan of care. No further questions. Also informed patient that he needs to see Dr. Garcia or KRISTEN in December. Will route to scheduling to set up appointment.

## 2023-08-22 NOTE — TELEPHONE ENCOUNTER
He can hold Plavix since he is greater than >6 months post JOHN. I would like him to remain on baby aspirin and restart Plavix ASAP after procedure.     He needs to see Vats or one of his KRISTEN in December. Can you please help with this?     JORGE Pineda, CNP

## 2023-10-05 NOTE — TELEPHONE ENCOUNTER
[facility] Call Center    Phone Message    May a detailed message be left on voicemail: yes     Reason for Call: Other: Pt would like a call back to discuss holding his plavix for 7 days for a kidney stone removal procedure. Please call pt back on Mobile number to discuss. Thank you     Action Taken: Message routed to:  Other: Cardiology    Travel Screening: Not Applicable        Thank you!  [facility]                                                                      Occupational Therapy Progress Note     Patient Name: Gamal Roland  GPSBZ'[id number] Date: 10/5/2023  Problem List  Principal Problem:    CHF exacerbation ([address])  Active Problems:    Hyperlipidemia    Hypertension    CKD (chronic kidney disease) stage 3, GFR 30-59 ml/min (HCC)    Coronary artery disease    Atrial fibrillation (HCC)    Type 2 diabetes mellitus with chronic kidney disease, without long-term current use of insulin, unspecified CKD stage (HCC)    Elevated troponin    Respiratory insufficiency          10/05/23 1150   OT Last Visit   OT Visit Date 10/05/23   Note Type   Note Type Treatment   Pain Assessment   Pain Assessment Tool 0-10   Pain Score 3   Pain Location/Orientation Orientation: Left; Location: Foot   Patient's Stated Pain Goal No pain   Hospital Pain Intervention(s) Repositioned; Ambulation/increased activity; Emotional support   Restrictions/Precautions   Weight Bearing Precautions Per Order No   Other Precautions Fall Risk;Pain   ADL   Where Assessed   (standard toilet)   Toileting Assistance  5  Supervision/Setup   Toileting Deficit Bedside commode;Perineal hygiene;Grab bar use   Toileting Comments supervision for perineal hygiene at this time. Bed Mobility   Supine to Sit 4  Minimal assistance   Additional items Assist x 1; Increased time required;Verbal cues;LE management   Additional Comments found in bed, left in chair w all needs in reach   Transfers   Sit to Stand 4  Minimal assistance   Additional items Assist x 1   Stand to Sit 5  Supervision   Additional Comments c rw   Functional Mobility   Functional Mobility 4  Minimal assistance   Additional Comments ax1, EOB<>Bath   Additional items Rolling walker   Toilet Transfers   Toilet Transfer From Rolling walker   Toilet Transfer Type To and from   Toilet Transfer to Standard toilet   Toilet Transfer Technique Ambulating   Toilet Transfers Contact guard   Toilet Transfers Comments use of grab bars and rw. vc for hand placement.  pt reports her toilet at home is raised and she does not need assistance w tx's from that toilet. Cognition   Overall Cognitive Status WFL   Arousal/Participation Alert; Responsive; Cooperative   Attention Within functional limits   Orientation Level Oriented X4   Memory Within functional limits   Following Commands Follows one step commands without difficulty   Comments pt pleasant and cooperative, overall fair safety awareness and insight to condition t/o session. Activity Tolerance   Activity Tolerance Patient tolerated treatment well   Assessment   Assessment Pt seen on this date for OT session focusing on ADL retraining,  body mechanics, transfer retraining, increasing activity tolerance/endurance to increase ability to participate in ADL/functional tasks. Pt was found in bed and was left in chair w/ all needs within reach. Pt completed bed mob w s, sts w cga and fm w min a from EOB<>bath. Toilet transfers completed w CGA to standard toilet. Pt able to complete perineal hygiene w/ S. Pt tolerated session well overall. rec d/c to home w home OT. The patient's raw score on the AM-PAC Daily Activity Inpatient Short Form is 21. A raw score of greater than or equal to 19 suggests the patient may benefit from discharge to home. Please refer to the recommendation of the Occupational Therapist for safe discharge planning. Plan   Treatment Interventions ADL retraining;Functional transfer training; Endurance training;Energy conservation; Activityengagement; Compensatory technique education   Goal Expiration Date 10/18/23   OT Treatment Day 1   OT Frequency 2-3x/wk   Recommendation   OT Discharge Recommendation Home with home health rehabilitation   [facility] Daily Activity Inpatient   Lower Body Dressing 3   Bathing 3   Toileting 3   Upper Body Dressing 4   Grooming 4   Eating 4   Daily Activity Raw Score 21   Daily Activity Standardized Score (Calc for Raw Score >=11) 44.27   -PAC Applied Cognition Inpatient   Following a Speech/Presentation 4   Understanding Ordinary Conversation 4   Taking Medications 4   Remembering Where Things Are Placed or Put Away 4   Remembering List of 4-5 Errands 4   Taking Care of Complicated Tasks 4   Applied Cognition Raw Score 24   Applied Cognition Standardized Score 62.21   Modified Ralls Scale   Modified Rajeev Scale 4   End of Consult   Education Provided Yes   Patient Position at End of Consult Bedside chair; All needs within reach   Nurse Communication Nurse aware of consult       GAURAV Martinez, OTR/L

## 2023-11-21 DIAGNOSIS — Z98.890 STATUS POST CORONARY ANGIOGRAM: ICD-10-CM

## 2023-11-21 DIAGNOSIS — R94.39 ABNORMAL CARDIOVASCULAR STRESS TEST: ICD-10-CM

## 2023-11-21 RX ORDER — LOSARTAN POTASSIUM 50 MG/1
50 TABLET ORAL AT BEDTIME
Qty: 90 TABLET | Refills: 0 | Status: SHIPPED | OUTPATIENT
Start: 2023-11-21 | End: 2023-12-05

## 2023-11-21 RX ORDER — ATORVASTATIN CALCIUM 40 MG/1
40 TABLET, FILM COATED ORAL DAILY
Qty: 90 TABLET | Refills: 0 | Status: SHIPPED | OUTPATIENT
Start: 2023-11-21 | End: 2023-12-05

## 2023-12-05 ENCOUNTER — OFFICE VISIT (OUTPATIENT)
Dept: CARDIOLOGY | Facility: CLINIC | Age: 63
End: 2023-12-05
Payer: COMMERCIAL

## 2023-12-05 VITALS
WEIGHT: 174.5 LBS | SYSTOLIC BLOOD PRESSURE: 128 MMHG | DIASTOLIC BLOOD PRESSURE: 78 MMHG | OXYGEN SATURATION: 99 % | BODY MASS INDEX: 27.39 KG/M2 | HEIGHT: 67 IN | HEART RATE: 56 BPM

## 2023-12-05 DIAGNOSIS — E78.5 HYPERLIPIDEMIA LDL GOAL <70: ICD-10-CM

## 2023-12-05 DIAGNOSIS — Z98.890 STATUS POST CORONARY ANGIOGRAM: ICD-10-CM

## 2023-12-05 DIAGNOSIS — R94.39 ABNORMAL CARDIOVASCULAR STRESS TEST: ICD-10-CM

## 2023-12-05 DIAGNOSIS — I25.10 CORONARY ARTERY DISEASE INVOLVING NATIVE CORONARY ARTERY OF NATIVE HEART WITHOUT ANGINA PECTORIS: ICD-10-CM

## 2023-12-05 PROCEDURE — 99215 OFFICE O/P EST HI 40 MIN: CPT | Performed by: INTERNAL MEDICINE

## 2023-12-05 RX ORDER — CLOPIDOGREL BISULFATE 75 MG/1
75 TABLET ORAL DAILY
Qty: 7 TABLET | Refills: 0 | Status: SHIPPED | OUTPATIENT
Start: 2023-12-05

## 2023-12-05 RX ORDER — OMEPRAZOLE 10 MG/1
20 CAPSULE, DELAYED RELEASE ORAL DAILY
COMMUNITY

## 2023-12-05 RX ORDER — LOSARTAN POTASSIUM 50 MG/1
50 TABLET ORAL AT BEDTIME
Qty: 90 TABLET | Refills: 3 | Status: SHIPPED | OUTPATIENT
Start: 2023-12-05

## 2023-12-05 RX ORDER — ATORVASTATIN CALCIUM 40 MG/1
40 TABLET, FILM COATED ORAL DAILY
Qty: 90 TABLET | Refills: 3 | Status: SHIPPED | OUTPATIENT
Start: 2023-12-05

## 2023-12-05 NOTE — LETTER
12/5/2023    Sagar Mosley MD  [address]  [facility] [address]    RE: Timothy Balderas       Dear Colleague,     I had the pleasure of seeing Timothy Balderas in the [facility].  HPI and Plan:   Mr Balderas very pleasant 63-year gentleman who I saw about a year ago because of severe coronary calcification typical exertional chest discomfort and exercise stress echocardiogram that showed abnormal stress EKG and chest discomfort with physical activity however no wall motion abnormalities, this prompted coronary angiogram he was found to have single-vessel proximal to mid LAD disease with positive IFR and FFR and mild nonobstructive coronary disease elsewhere and he underwent successful 2.5 x 38 mm Synergy drug recent postdilated to 3.4 mm proximally.  Today is coming for routine follow-up.  A few months subsequent to the PCI patient had an accident while skiing that led to a large hematoma over the thigh and briefly is dual antiplatelet therapy was withheld but it was then subsequently resumed.  Patient tells me overall health wise he feels fine.  He is fairly active and did not tolerate beta-blocker due to resting bradycardia and inability to increase his heart rate during exercise.  He has hypertension is on losartan.  He is on Lipitor 40 mg daily LDL last year was 77 but patient tells me that this was recently checked with his PCP office and was within normal limits.  Patient tells me that ever since the ski accident he has been somewhat scared about being physically active although he is now trying to stay into a physically active reinitiation bikes 4-5 times a week no exertional symptoms or chest discomfort or dizziness presyncope or syncope.    Assessment plan  CAD with the typical exertional symptoms last year s/p successful proximal to mid LAD PCI.  Clinically no anginal symptoms.  On aspirin, Plavix, high intensity statin, losartan.  Did not tolerate beta-blocker for reasons noted  above.  Discussed at length with patient options of continuing dual antiplatelet therapy versus mono antiplatelet therapy.  We also discussed option of continuing aspirin as mono antiplatelet therapy versus Plavix as a mono antiplatelet therapy.  Given the fact that the LAD PCI was done in the setting of stable CAD without any MI it is reasonable to be on mono antiplatelet therapy after 1 year of PCI which will be next week.  I recommend to continue Plavix for another 7 days.  Patient prefers to be on aspirin monitor blood therapy as he wants to stay physically active and would like to minimize his risk of developing bleeding complication while playing sports or outdoor physical activity.  So going forward patient will take Plavix for another 7 days to complete 1 year of dual antiplatelet therapy and then stop Plavix and continue aspirin 81 mg daily indefinitely.  Hypertension controlled on losartan, recent BMP showed normal kidney functions  Dyslipidemia LDL was 77 last year, patient follows through his PCP regarding lipids.  He will update our clinic with the latest blood panel results.  Goal LDL will be less than 70.    Recommendations  Take Plavix for another 7 days then stop Plavix and continue aspirin 81 mg daily indefinitely  Continue Lipitor and losartan  Follow-up in a year, sooner if he notes any change in clinical status    Today's clinic visit entailed:      The level of medical decision making during this visit was of high complexity.      Orders Placed This Encounter   Procedures    Follow-Up with Cardiology       Orders Placed This Encounter   Medications    omeprazole (PRILOSEC) 10 MG DR capsule     Sig: Take 20 mg by mouth daily    clopidogrel (PLAVIX) 75 MG tablet     Sig: Take 1 tablet (75 mg) by mouth daily Take 300mg (four 75mg tablets) day 1 then 75mg everyday thereafter.     Dispense:  7 tablet     Refill:  0    atorvastatin (LIPITOR) 40 MG tablet     Sig: Take 1 tablet (40 mg) by mouth daily      Dispense:  90 tablet     Refill:  3    losartan (COZAAR) 50 MG tablet     Sig: Take 1 tablet (50 mg) by mouth at bedtime     Dispense:  90 tablet     Refill:  3       Medications Discontinued During This Encounter   Medication Reason    clopidogrel (PLAVIX) 75 MG tablet Reorder (No AVS)    atorvastatin (LIPITOR) 40 MG tablet Reorder (No AVS)    losartan (COZAAR) 50 MG tablet Reorder (No AVS)         Encounter Diagnoses   Name Primary?    Coronary artery disease involving native coronary artery of native heart without angina pectoris     Hyperlipidemia LDL goal <70     Abnormal cardiovascular stress test     Status post coronary angiogram        CURRENT MEDICATIONS:  Current Outpatient Medications   Medication Sig Dispense Refill    albuterol (PROVENTIL HFA: VENTOLIN HFA) 108 (90 BASE) MCG/ACT inhaler Inhale 2 puffs into the lungs every 6 hours as needed for shortness of breath / dyspnea. 1 Inhaler 6    aspirin (ASA) 81 MG EC tablet Take 1 tablet by mouth daily      atorvastatin (LIPITOR) 40 MG tablet Take 1 tablet (40 mg) by mouth daily 90 tablet 3    azelastine (ASTELIN) 137 MCG/SPRAY nasal spray [place] 1 spray into both nostrils 2 times daily      clopidogrel (PLAVIX) 75 MG tablet Take 1 tablet (75 mg) by mouth daily Take 300mg (four 75mg tablets) day 1 then 75mg everyday thereafter. 7 tablet 0    FLUTICASONE PROPIONATE, NASAL, NA Instill 1 spray in both nostrils twice daily      fluticasone-vilanterol (BREO ELLIPTA) 100-25 MCG/ACT inhaler Inhale 1 puff into the lungs daily as needed      losartan (COZAAR) 50 MG tablet Take 1 tablet (50 mg) by mouth at bedtime 90 tablet 3    niacin 500 MG tablet Take 500 mg by mouth daily      nitroGLYcerin (NITROSTAT) 0.4 MG sublingual tablet For chest pain place 1 tablet under the tongue every 5 minutes for 3 doses. If symptoms persist 5 minutes after 2nd dose call 911. 30 tablet 11    omeprazole (PRILOSEC) 10 MG DR capsule Take 20 mg by mouth daily      triamcinolone  (KENALOG) 0.1 % ointment APPLY SPARINGLY TO HANDS TWICE DAILY AS NEEDED TO CONTROL CONDITION. 30 g 0    famotidine (PEPCID) 20 MG tablet Take 20 mg by mouth 2 times daily (Patient not taking: Reported on 2023)         ALLERGIES     Allergies   Allergen Reactions    Erythromycin        PAST MEDICAL HISTORY:  Past Medical History:   Diagnosis Date    CAD (coronary artery disease)     Essential hypertension     Unspecified asthma(493.90) 1963       PAST SURGICAL HISTORY:  Past Surgical History:   Procedure Laterality Date    CV CORONARY ANGIOGRAM N/A 2022    Procedure: Coronary Angiogram;  Surgeon: Xavier Alonso MD;  Location: [facility] CARDIAC CATH LAB    CV FRACTIONAL FLOW RATIO WIRE N/A 2022    Procedure: Fractional Flow Ratio Wire;  Surgeon: Xavier Alonso MD;  Location: [facility] CARDIAC CATH LAB    CV INSTANTANEOUS WAVE-FREE RATIO N/A 2022    Procedure: Instantaneous Wave-Free Ratio;  Surgeon: Xavier Alonso MD;  Location: [facility] CARDIAC CATH LAB    CV INTRAVASULAR ULTRASOUND N/A 2022    Procedure: Intravascular Ultrasound;  Surgeon: Xavier Alonso MD;  Location: [facility] CARDIAC CATH LAB    CV PCI STENT DRUG ELUTING N/A 2022    Procedure: Percutaneous Coronary Intervention Stent;  Surgeon: Xavier Alonso MD;  Location: [facility] CARDIAC CATH LAB    ZZC NONSPECIFIC PROCEDURE      ORIF L middle finger       FAMILY HISTORY:  Family History   Problem Relation Age of Onset    Hypertension Mother         alive age 73 arthritis    Heart Disease Father         alive age 73    Musculoskeletal Disorder Sister          age 42 ALS    Neurologic Disorder Sister         alive age 43 migranes    Family History Negative Sister         alive age 41    Breast Cancer Paternal Aunt             Breast Cancer Paternal Aunt             Breast Cancer Paternal Aunt             Diabetes Maternal Aunt           "late 20's       SOCIAL HISTORY:  Social History     Socioeconomic History    Marital status:      Spouse name: Jeannette    Number of children: 3    Years of education: None    Highest education level: None   Occupational History    Occupation: clinical research manager     Comment: advanced [employer]   Tobacco Use    Smoking status: Never    Smokeless tobacco: Never   Vaping Use    Vaping Use: Never used   Substance and Sexual Activity    Alcohol use: Yes     Comment: occasional    Drug use: No    Sexual activity: Yes     Partners: Female       Review of Systems:  Skin:          Eyes:         ENT:         Respiratory:  Negative       Cardiovascular: No exertional chest discomfort, presyncope syncope  Gastroenterology:        Genitourinary:         Musculoskeletal:         Neurologic:         Psychiatric:         Heme/Lymph/Imm:         Endocrine:           Physical Exam:  Vitals: /78 (BP Location: Left arm, Patient Position: Sitting)   Pulse 56   Ht 1.702 m (5' 7\")   Wt 79.2 kg (174 lb 8 oz)   SpO2 99%   BMI 27.33 kg/m      General patient appears comfortable  Neck normal JVP  Cardiovascular system S1-S2 normal no murmur rub or gallop  Respiratory system clear to auscultation  Extremities no edema  Neurological alert, oriented    CC  Dedra Ekman, APRN CNP  [address]  [place] [address]      Thank you for allowing me to participate in the care of your patient.      Sincerely,     Deric Garcia MD     [facility]  "

## 2023-12-05 NOTE — PROGRESS NOTES
HPI and Plan:   Mr Balderas very pleasant 63-year gentleman who I saw about a year ago because of severe coronary calcification typical exertional chest discomfort and exercise stress echocardiogram that showed abnormal stress EKG and chest discomfort with physical activity however no wall motion abnormalities, this prompted coronary angiogram he was found to have single-vessel proximal to mid LAD disease with positive IFR and FFR and mild nonobstructive coronary disease elsewhere and he underwent successful 2.5 x 38 mm Synergy drug recent postdilated to 3.4 mm proximally.  Today is coming for routine follow-up.  A few months subsequent to the PCI patient had an accident while skiing that led to a large hematoma over the thigh and briefly is dual antiplatelet therapy was withheld but it was then subsequently resumed.  Patient tells me overall health wise he feels fine.  He is fairly active and did not tolerate beta-blocker due to resting bradycardia and inability to increase his heart rate during exercise.  He has hypertension is on losartan.  He is on Lipitor 40 mg daily LDL last year was 77 but patient tells me that this was recently checked with his PCP office and was within normal limits.  Patient tells me that ever since the ski accident he has been somewhat scared about being physically active although he is now trying to stay into a physically active reinitiation bikes 4-5 times a week no exertional symptoms or chest discomfort or dizziness presyncope or syncope.    Assessment plan  CAD with the typical exertional symptoms last year s/p successful proximal to mid LAD PCI.  Clinically no anginal symptoms.  On aspirin, Plavix, high intensity statin, losartan.  Did not tolerate beta-blocker for reasons noted above.  Discussed at length with patient options of continuing dual antiplatelet therapy versus mono antiplatelet therapy.  We also discussed option of continuing aspirin as mono antiplatelet therapy versus  Plavix as a mono antiplatelet therapy.  Given the fact that the LAD PCI was done in the setting of stable CAD without any MI it is reasonable to be on mono antiplatelet therapy after 1 year of PCI which will be next week.  I recommend to continue Plavix for another 7 days.  Patient prefers to be on aspirin monitor blood therapy as he wants to stay physically active and would like to minimize his risk of developing bleeding complication while playing sports or outdoor physical activity.  So going forward patient will take Plavix for another 7 days to complete 1 year of dual antiplatelet therapy and then stop Plavix and continue aspirin 81 mg daily indefinitely.  Hypertension controlled on losartan, recent BMP showed normal kidney functions  Dyslipidemia LDL was 77 last year, patient follows through his PCP regarding lipids.  He will update our clinic with the latest blood panel results.  Goal LDL will be less than 70.    Recommendations  Take Plavix for another 7 days then stop Plavix and continue aspirin 81 mg daily indefinitely  Continue Lipitor and losartan  Follow-up in a year, sooner if he notes any change in clinical status    Today's clinic visit entailed:      The level of medical decision making during this visit was of high complexity.      Orders Placed This Encounter   Procedures    Follow-Up with Cardiology       Orders Placed This Encounter   Medications    omeprazole (PRILOSEC) 10 MG DR capsule     Sig: Take 20 mg by mouth daily    clopidogrel (PLAVIX) 75 MG tablet     Sig: Take 1 tablet (75 mg) by mouth daily Take 300mg (four 75mg tablets) day 1 then 75mg everyday thereafter.     Dispense:  7 tablet     Refill:  0    atorvastatin (LIPITOR) 40 MG tablet     Sig: Take 1 tablet (40 mg) by mouth daily     Dispense:  90 tablet     Refill:  3    losartan (COZAAR) 50 MG tablet     Sig: Take 1 tablet (50 mg) by mouth at bedtime     Dispense:  90 tablet     Refill:  3       Medications Discontinued During This  Encounter   Medication Reason    clopidogrel (PLAVIX) 75 MG tablet Reorder (No AVS)    atorvastatin (LIPITOR) 40 MG tablet Reorder (No AVS)    losartan (COZAAR) 50 MG tablet Reorder (No AVS)         Encounter Diagnoses   Name Primary?    Coronary artery disease involving native coronary artery of native heart without angina pectoris     Hyperlipidemia LDL goal <70     Abnormal cardiovascular stress test     Status post coronary angiogram        CURRENT MEDICATIONS:  Current Outpatient Medications   Medication Sig Dispense Refill    albuterol (PROVENTIL HFA: VENTOLIN HFA) 108 (90 BASE) MCG/ACT inhaler Inhale 2 puffs into the lungs every 6 hours as needed for shortness of breath / dyspnea. 1 Inhaler 6    aspirin (ASA) 81 MG EC tablet Take 1 tablet by mouth daily      atorvastatin (LIPITOR) 40 MG tablet Take 1 tablet (40 mg) by mouth daily 90 tablet 3    azelastine (ASTELIN) 137 MCG/SPRAY nasal spray [place] 1 spray into both nostrils 2 times daily      clopidogrel (PLAVIX) 75 MG tablet Take 1 tablet (75 mg) by mouth daily Take 300mg (four 75mg tablets) day 1 then 75mg everyday thereafter. 7 tablet 0    FLUTICASONE PROPIONATE, NASAL, NA Instill 1 spray in both nostrils twice daily      fluticasone-vilanterol (BREO ELLIPTA) 100-25 MCG/ACT inhaler Inhale 1 puff into the lungs daily as needed      losartan (COZAAR) 50 MG tablet Take 1 tablet (50 mg) by mouth at bedtime 90 tablet 3    niacin 500 MG tablet Take 500 mg by mouth daily      nitroGLYcerin (NITROSTAT) 0.4 MG sublingual tablet For chest pain place 1 tablet under the tongue every 5 minutes for 3 doses. If symptoms persist 5 minutes after 2nd dose call 911. 30 tablet 11    omeprazole (PRILOSEC) 10 MG DR capsule Take 20 mg by mouth daily      triamcinolone (KENALOG) 0.1 % ointment APPLY SPARINGLY TO HANDS TWICE DAILY AS NEEDED TO CONTROL CONDITION. 30 g 0    famotidine (PEPCID) 20 MG tablet Take 20 mg by mouth 2 times daily (Patient not taking: Reported on 12/5/2023)          ALLERGIES     Allergies   Allergen Reactions    Erythromycin        PAST MEDICAL HISTORY:  Past Medical History:   Diagnosis Date    CAD (coronary artery disease)     Essential hypertension     Unspecified asthma(493.90) 1963       PAST SURGICAL HISTORY:  Past Surgical History:   Procedure Laterality Date    CV CORONARY ANGIOGRAM N/A 2022    Procedure: Coronary Angiogram;  Surgeon: Xavier Alonso MD;  Location: [facility] CARDIAC CATH LAB    CV FRACTIONAL FLOW RATIO WIRE N/A 2022    Procedure: Fractional Flow Ratio Wire;  Surgeon: Xavier Alonso MD;  Location: [facility] CARDIAC CATH LAB    CV INSTANTANEOUS WAVE-FREE RATIO N/A 2022    Procedure: Instantaneous Wave-Free Ratio;  Surgeon: Xavier Alonso MD;  Location: [facility] CARDIAC CATH LAB    CV INTRAVASULAR ULTRASOUND N/A 2022    Procedure: Intravascular Ultrasound;  Surgeon: Xavier Alonso MD;  Location: [facility] CARDIAC CATH LAB    CV PCI STENT DRUG ELUTING N/A 2022    Procedure: Percutaneous Coronary Intervention Stent;  Surgeon: Xavier Alonso MD;  Location:  HEART CARDIAC CATH LAB    ZZC NONSPECIFIC PROCEDURE      ORIF L middle finger       FAMILY HISTORY:  Family History   Problem Relation Age of Onset    Hypertension Mother         alive age 73 arthritis    Heart Disease Father         alive age 73    Musculoskeletal Disorder Sister          age 42 ALS    Neurologic Disorder Sister         alive age 43 migranes    Family History Negative Sister         alive age 41    Breast Cancer Paternal Aunt             Breast Cancer Paternal Aunt             Breast Cancer Paternal Aunt             Diabetes Maternal Aunt          late 20's       SOCIAL HISTORY:  Social History     Socioeconomic History    Marital status:      Spouse name: Jeannette    Number of children: 3    Years of education: None    Highest education level: None  "  Occupational History    Occupation: clinical research manager     Comment: advanced [employer]   Tobacco Use    Smoking status: Never    Smokeless tobacco: Never   Vaping Use    Vaping Use: Never used   Substance and Sexual Activity    Alcohol use: Yes     Comment: occasional    Drug use: No    Sexual activity: Yes     Partners: Female       Review of Systems:  Skin:          Eyes:         ENT:         Respiratory:  Negative       Cardiovascular: No exertional chest discomfort, presyncope syncope  Gastroenterology:        Genitourinary:         Musculoskeletal:         Neurologic:         Psychiatric:         Heme/Lymph/Imm:         Endocrine:           Physical Exam:  Vitals: /78 (BP Location: Left arm, Patient Position: Sitting)   Pulse 56   Ht 1.702 m (5' 7\")   Wt 79.2 kg (174 lb 8 oz)   SpO2 99%   BMI 27.33 kg/m      General patient appears comfortable  Neck normal JVP  Cardiovascular system S1-S2 normal no murmur rub or gallop  Respiratory system clear to auscultation  Extremities no edema  Neurological alert, oriented    MARCUS Colmenares Ekman, APRN CNP  [address]  [place] [address]                "

## 2023-12-12 ENCOUNTER — VIRTUAL VISIT (OUTPATIENT)
Dept: CARDIOLOGY | Facility: CLINIC | Age: 63
End: 2023-12-12
Payer: COMMERCIAL

## 2023-12-12 DIAGNOSIS — Z00.6 EXAMINATION OF PARTICIPANT OR CONTROL IN CLINICAL RESEARCH: ICD-10-CM

## 2023-12-12 DIAGNOSIS — I25.10 CORONARY ARTERY DISEASE INVOLVING NATIVE CORONARY ARTERY OF NATIVE HEART WITHOUT ANGINA PECTORIS: Primary | ICD-10-CM

## 2023-12-12 PROCEDURE — 99207 PR NO CHARGE-RESEARCH SERVICE: CPT

## 2023-12-12 NOTE — PROGRESS NOTES
Study follow up visits  [] Month 1  [] Month 6 [x] Month 12   [] Month 24    Timothyjose Balderas seen in clinic for [x] scheduled follow up visit OR for [] unscheduled follow up visit    Patient saw cardiology since last visit and his Plavix is discontinued a of today. He maureen be o ASA 81mg indefinitely.     SAQ-7 & EQ-5D done per protocol   Answers reviewed for adverse events    CCS: 0 - no anginal symptoms per patient    Grade I  Ordinary physical activity does not cause angina, such as walking   and climbing stairs.  Angina with strenuous or rapid or prolonged  exertion at work or recreation   Grade II  Slight limitation of ordinary activity.  Walking or climbing stairs   rapidly, walking uphill, walking or stair climbing after meals, or in   cold, or in wind, or under emotional stress, or only during the few   hours after awakening.  Walking more than two blocks on the level   and climbing more than one flight of ordinary stairs at a normal   pace and in normal conditions   Grade III Marked limitation of ordinary physical activity.  Walking one or two   blocks on the level and climbing one flight of stairs in normal   conditions and at normal pace   Grade IV  Inability to carry on any physical activity without discomfort,   anginal syndrome may be present at rest       Reviewed  medications with participant-see list below   [x] No changes in anti-anginal medications   [] Anti-anginal medications changed    Reviewed with participant any other events since last visit None   [] MI   [] Revascularization   [] Hospitalization for CV cause   [] hospitalized for non-CV cause    Reviewed Medical Resource Utilization   [] Hospital admission  If yes, how many    [x] ED visit   If yes, how many  1 for renal stone  [x] Clinic visits   If yes, how many 3 - 1 cardio FU    Any OP diagnostic testing:   [] yes  [x] no   If yes, how many     Participant updated with study visits, any new study information and they agree to continue  to participate.    Next study visit: Year 2 (due 12/12/2024)    Lorna Moreno RN     Current Outpatient Medications   Medication Instructions    albuterol (PROVENTIL HFA: VENTOLIN HFA) 108 (90 BASE) MCG/ACT inhaler 2 puffs, Inhalation, EVERY 6 HOURS PRN    aspirin (ASA) 81 MG EC tablet 1 tablet, Oral, DAILY    atorvastatin (LIPITOR) 40 mg, Oral, DAILY    azelastine (ASTELIN) 137 MCG/SPRAY nasal spray 1 spray, Both Nostrils, 2 TIMES DAILY    clopidogrel (PLAVIX) 75 mg, Oral, DAILY, Take 300mg (four 75mg tablets) day 1 then 75mg everyday thereafter.    famotidine (PEPCID) 20 mg, 2 TIMES DAILY    FLUTICASONE PROPIONATE, NASAL, NA Instill 1 spray in both nostrils twice daily    fluticasone-vilanterol (BREO ELLIPTA) 100-25 MCG/ACT inhaler 1 puff, Inhalation, DAILY PRN    losartan (COZAAR) 50 mg, Oral, AT BEDTIME    niacin 500 mg, Oral, DAILY    nitroGLYcerin (NITROSTAT) 0.4 MG sublingual tablet For chest pain place 1 tablet under the tongue every 5 minutes for 3 doses. If symptoms persist 5 minutes after 2nd dose call 911.    omeprazole (PRILOSEC) 20 mg, Oral, DAILY    triamcinolone (KENALOG) 0.1 % ointment APPLY SPARINGLY TO HANDS TWICE DAILY AS NEEDED TO CONTROL CONDITION.    Lorna Moreno RN

## 2023-12-19 DIAGNOSIS — I25.10 CAD (CORONARY ARTERY DISEASE): ICD-10-CM

## 2023-12-19 RX ORDER — NITROGLYCERIN 0.4 MG/1
TABLET SUBLINGUAL
Qty: 25 TABLET | Refills: 3 | Status: SHIPPED | OUTPATIENT
Start: 2023-12-19

## 2024-01-13 ENCOUNTER — HEALTH MAINTENANCE LETTER (OUTPATIENT)
Age: 64
End: 2024-01-13

## 2024-10-01 ENCOUNTER — TRANSFERRED RECORDS (OUTPATIENT)
Dept: HEALTH INFORMATION MANAGEMENT | Facility: CLINIC | Age: 64
End: 2024-10-01

## 2024-12-02 ENCOUNTER — VIRTUAL VISIT (OUTPATIENT)
Dept: CARDIOLOGY | Facility: CLINIC | Age: 64
End: 2024-12-02
Payer: COMMERCIAL

## 2024-12-02 DIAGNOSIS — Z00.6 EXAMINATION OF PARTICIPANT OR CONTROL IN CLINICAL RESEARCH: ICD-10-CM

## 2024-12-02 DIAGNOSIS — I25.10 CORONARY ARTERY DISEASE INVOLVING NATIVE CORONARY ARTERY OF NATIVE HEART WITHOUT ANGINA PECTORIS: Primary | ICD-10-CM

## 2024-12-02 NOTE — CONFIDENTIAL NOTE
Study follow up visits  [] Month 1  [] Month 6 [] Month 12   [x] Month 24    Timothy LUDIN Balderas seen in clinic for [x] scheduled phone follow up visit OR for [] unscheduled follow up visit    There were no vitals taken for this visit.    SAQ-7 & EQ-5D done per protocol   Answers reviewed for adverse events    CCS: NOT APPLICABLE    Grade I  Ordinary physical activity does not cause angina, such as walking   and climbing stairs.  Angina with strenuous or rapid or prolonged  exertion at work or recreation   Grade II  Slight limitation of ordinary activity.  Walking or climbing stairs   rapidly, walking uphill, walking or stair climbing after meals, or in   cold, or in wind, or under emotional stress, or only during the few   hours after awakening.  Walking more than two blocks on the level   and climbing more than one flight of ordinary stairs at a normal   pace and in normal conditions   Grade III Marked limitation of ordinary physical activity.  Walking one or two   blocks on the level and climbing one flight of stairs in normal   conditions and at normal pace   Grade IV  Inability to carry on any physical activity without discomfort,   anginal syndrome may be present at rest       Reviewed  medications with participant-see list below   [x] No changes in anti-anginal medications   [] Anti-anginal medications changed    Reviewed with participant any other events since last visit   [] MI   [] Revascularization   [] Hospitalization for CV cause   [] hospitalized for non-CV cause    Reviewed Medical Resource Utilization - None noted   [] Hospital admission  If yes, how many    [] ED visit   If yes, how many    [] Clinic visits   If yes, how many     Any OP diagnostic testing:   [] yes  [x] no   If yes, how many     Participant updated study has now been completed and is thanked for participating.     Lorna Moreno, ARLENE     Current Outpatient Medications   Medication Instructions    albuterol (PROVENTIL HFA: VENTOLIN HFA)  108 (90 BASE) MCG/ACT inhaler 2 puffs, Inhalation, EVERY 6 HOURS PRN    aspirin (ASA) 81 MG EC tablet 1 tablet, Oral, DAILY    atorvastatin (LIPITOR) 40 mg, Oral, DAILY    azelastine (ASTELIN) 137 MCG/SPRAY nasal spray 1 spray, Both Nostrils, 2 TIMES DAILY    clopidogrel (PLAVIX) 75 mg, Oral, DAILY, Take 300mg (four 75mg tablets) day 1 then 75mg everyday thereafter.    famotidine (PEPCID) 20 mg, 2 TIMES DAILY    FLUTICASONE PROPIONATE, NASAL, NA Instill 1 spray in both nostrils twice daily    fluticasone-vilanterol (BREO ELLIPTA) 100-25 MCG/ACT inhaler 1 puff, Inhalation, DAILY PRN    losartan (COZAAR) 50 mg, Oral, AT BEDTIME    niacin 500 mg, Oral, DAILY    nitroGLYcerin (NITROSTAT) 0.4 MG sublingual tablet For chest pain place 1 tablet under the tongue every 5 minutes for 3 doses. If symptoms persist 5 minutes after 2nd dose call 911.    omeprazole (PRILOSEC) 20 mg, Oral, DAILY    triamcinolone (KENALOG) 0.1 % ointment APPLY SPARINGLY TO HANDS TWICE DAILY AS NEEDED TO CONTROL CONDITION.

## 2024-12-02 NOTE — PROGRESS NOTES
Study follow up visits  [] Month 1  [] Month 6 [] Month 12   [x] Month 24    Timothy Mejiaer seen in clinic for [x] scheduled phone follow up visit OR for [] unscheduled follow up visit    There were no vitals taken for this visit.    SAQ-7 & EQ-5D done per protocol   Answers reviewed for adverse events    CCS: NOT APPLICABLE    Grade I  Ordinary physical activity does not cause angina, such as walking   and climbing stairs.  Angina with strenuous or rapid or prolonged  exertion at work or recreation   Grade II  Slight limitation of ordinary activity.  Walking or climbing stairs   rapidly, walking uphill, walking or stair climbing after meals, or in   cold, or in wind, or under emotional stress, or only during the few   hours after awakening.  Walking more than two blocks on the level   and climbing more than one flight of ordinary stairs at a normal   pace and in normal conditions   Grade III Marked limitation of ordinary physical activity.  Walking one or two   blocks on the level and climbing one flight of stairs in normal   conditions and at normal pace   Grade IV  Inability to carry on any physical activity without discomfort,   anginal syndrome may be present at rest       Reviewed  medications with participant-see list below, Patient was given Brilinta post cath and this went until January 26th, 2023, then switched to Plavix for which he discontinued on 12/13/2023, remains on ASA 81 mg.    [x] No changes in anti-anginal medications   [] Anti-anginal medications changed    Reviewed with participant any other events since last visit   [] MI   [] Revascularization   [] Hospitalization for CV cause   [] hospitalized for non-CV cause    Reviewed Medical Resource Utilization - None noted   [] Hospital admission  If yes, how many    [] ED visit   If yes, how many    [] Clinic visits   If yes, how many     Any OP diagnostic testing:   [] yes  [x] no   If yes, how many     Participant informed that study has now  been completed and was thanked for participating.     Lorna Moreno, ARLENE     Current Outpatient Medications   Medication Instructions    albuterol (PROVENTIL HFA: VENTOLIN HFA) 108 (90 BASE) MCG/ACT inhaler 2 puffs, Inhalation, EVERY 6 HOURS PRN    aspirin (ASA) 81 MG EC tablet 1 tablet, Oral, DAILY    atorvastatin (LIPITOR) 40 mg, Oral, DAILY    azelastine (ASTELIN) 137 MCG/SPRAY nasal spray 1 spray, Both Nostrils, 2 TIMES DAILY    famotidine (PEPCID) 20 mg, 2 TIMES DAILY    FLUTICASONE PROPIONATE, NASAL, NA Instill 1 spray in both nostrils twice daily    fluticasone-vilanterol (BREO ELLIPTA) 100-25 MCG/ACT inhaler 1 puff, Inhalation, DAILY PRN    losartan (COZAAR) 50 mg, Oral, AT BEDTIME    niacin 500 mg, Oral, DAILY    nitroGLYcerin (NITROSTAT) 0.4 MG sublingual tablet For chest pain place 1 tablet under the tongue every 5 minutes for 3 doses. If symptoms persist 5 minutes after 2nd dose call 911.    omeprazole (PRILOSEC) 20 mg, Oral, DAILY    triamcinolone (KENALOG) 0.1 % ointment APPLY SPARINGLY TO HANDS TWICE DAILY AS NEEDED TO CONTROL CONDITION.

## 2024-12-12 DIAGNOSIS — Z98.890 STATUS POST CORONARY ANGIOGRAM: ICD-10-CM

## 2024-12-12 RX ORDER — LOSARTAN POTASSIUM 50 MG/1
50 TABLET ORAL AT BEDTIME
Qty: 90 TABLET | Refills: 0 | Status: SHIPPED | OUTPATIENT
Start: 2024-12-12

## 2025-01-07 ENCOUNTER — CARE COORDINATION (OUTPATIENT)
Dept: CARDIOLOGY | Facility: CLINIC | Age: 65
End: 2025-01-07
Payer: COMMERCIAL

## 2025-01-13 ENCOUNTER — OFFICE VISIT (OUTPATIENT)
Dept: CARDIOLOGY | Facility: CLINIC | Age: 65
End: 2025-01-13
Payer: COMMERCIAL

## 2025-01-13 VITALS
WEIGHT: 176.6 LBS | HEART RATE: 60 BPM | SYSTOLIC BLOOD PRESSURE: 136 MMHG | HEIGHT: 68 IN | BODY MASS INDEX: 26.76 KG/M2 | DIASTOLIC BLOOD PRESSURE: 78 MMHG

## 2025-01-13 DIAGNOSIS — I10 BENIGN ESSENTIAL HYPERTENSION: ICD-10-CM

## 2025-01-13 DIAGNOSIS — I25.10 CORONARY ARTERY DISEASE INVOLVING NATIVE CORONARY ARTERY OF NATIVE HEART WITHOUT ANGINA PECTORIS: Primary | ICD-10-CM

## 2025-01-13 DIAGNOSIS — E78.5 HYPERLIPIDEMIA LDL GOAL <70: ICD-10-CM

## 2025-01-13 DIAGNOSIS — Z98.890 STATUS POST CORONARY ANGIOGRAM: ICD-10-CM

## 2025-01-13 DIAGNOSIS — R94.39 ABNORMAL CARDIOVASCULAR STRESS TEST: ICD-10-CM

## 2025-01-13 PROCEDURE — 99214 OFFICE O/P EST MOD 30 MIN: CPT | Performed by: NURSE PRACTITIONER

## 2025-01-13 RX ORDER — ATORVASTATIN CALCIUM 40 MG/1
40 TABLET, FILM COATED ORAL DAILY
Qty: 90 TABLET | Refills: 3 | Status: SHIPPED | OUTPATIENT
Start: 2025-01-13

## 2025-01-13 RX ORDER — LOSARTAN POTASSIUM 50 MG/1
50 TABLET ORAL AT BEDTIME
Qty: 90 TABLET | Refills: 3 | Status: SHIPPED | OUTPATIENT
Start: 2025-01-13

## 2025-01-13 NOTE — PROGRESS NOTES
Cardiology Clinic Progress Note  Timothy Balderas MRN# [id number]   YOB: 1960 Age: 64 year old   Primary Cardiologist: Dr. Garcia Reason for visit: Annual follow up             Assessment and Plan:       1.  Coronary artery disease, s/p JOHN x 1 to LAD with mild nonobstructive disease elsewhere  -2022 NAN with normal LVEF  -No anginal symptoms    2.  Hypertension, well controlled     3.  Dyslipidemia  -Last lipid profile from 5/5/2022 showing borderline LDL at 77 and a normal ALT in 8/2023    Timothy is doing well from a cardiovascular standpoint without any symptoms suggestive of angina.  He continues to exercise routinely and is looking forward to being able to ski now that he is no longer on Plavix.  His blood pressure is well-controlled on losartan with a BMP showing normal values in August 2023.  He does follow routinely with his primary care provider at [facility] family physicians are in notes his cholesterol was checked within the last year.  We will try to get these results.  We discussed if his LDL still greater than 70 we will plan to intensify his atorvastatin to 80 mg daily.  I think is reasonable for him to follow-up in 1 year with Dr. Garcia or sooner if needed.    Plan:  Continue aspirin 81 mg daily and atorvastatin 40 mg daily  Continue losartan 50 mg daily    Follow up: Follow-up with Dr. Garcia in 1 year        History of Presenting Illness:    Timothy Balderas is a very pleasant 64 year old male with a history of premature CAD with his father having coronary disease in his early 50s and undergoing bypass, hyperlipidemia and previously noted coronary calcifications on CT scan.     Patient was initially seen by Dr. Garcia in 2022 for consultation due to exertional chest discomfort and abnormal stress EKG.  There were EKG changes present, however no ischemia noted on the echocardiogram portion.  A subsequent coronary angiogram showed single-vessel disease of the proximal to mid LAD that was  hemodynamically significant treated with PCI and JOHN x 1 and mild nonobstructive disease elsewhere.    In February 2023 he was hospitalized after traumatic hematoma while skiing.  He had a significant hemoglobin drop ultimately was resumed on DAPT while inpatient.  During that admission his metoprolol and losartan were discontinued due to symptomatic hypotension.    He was seen most recently in clinic by Dr. Garcia in December 2023 at which time he was stable from a cardiovascular standpoint and his Plavix was discontinued.    Patient is here today for an annual follow up. Patient reports feeling good. Patient denies chest pain or chest tightness. Denies dizziness, lightheadedness or other presyncopal symptoms. Denies tachycardia or palpitations. Denies shortness of breath, orthopnea, or PND.    Most recent labs from August 2023 show sodium 142, potassium 4.2, BUN 18.2, creatinine 1.07, GFR 77, hemoglobin 14.1 platelets 244, ALT 29.    Blood pressure 136/78 and HR 60 in clinic today.  Denies any bleeding or bruising concerns on aspirin.    Diet: limits red meat  Exercise: 4-5 days a week at home on exercise bike and strength training   Alcohol: occasionally   Tobacco: none         Recent Hospitalizations   None in 2024           Social History      Social History     Socioeconomic History    Marital status:      Spouse name: Jeannette    Number of children: 3    Years of education: Not on file    Highest education level: Not on file   Occupational History    Occupation: clinical research manager     Comment: [employer]   Tobacco Use    Smoking status: Never    Smokeless tobacco: Never   Vaping Use    Vaping status: Never Used   Substance and Sexual Activity    Alcohol use: Yes     Comment: occasional    Drug use: No    Sexual activity: Yes     Partners: Female   Other Topics Concern    Not on file   Social History Narrative    Not on file     Social Drivers of Health     Financial Resource Strain: Not on  "file   Food Insecurity: Not on file   Transportation Needs: Not on file   Physical Activity: Not on file   Stress: Not on file   Social Connections: Not on file   Interpersonal Safety: Not on file   Housing Stability: Not on file            Review of Systems:   Skin:  Negative for bruising   Eyes:       ENT:       Respiratory:  Negative for dyspnea on exertion, shortness of breath, dyspnea at rest  Cardiovascular:  Negative for, palpitations, chest pain, edema, syncope or near-syncope, dizziness, lightheadedness, exercise intolerance    Gastroenterology:      Genitourinary:       Musculoskeletal:       Neurologic:       Psychiatric:       Heme/Lymph/Imm:       Endocrine:              Physical Exam:   Vitals: /78   Pulse 60   Ht 1.715 m (5' 7.5\")   Wt 80.1 kg (176 lb 9.6 oz)   BMI 27.25 kg/m     Wt Readings from Last 4 Encounters:   01/13/25 80.1 kg (176 lb 9.6 oz)   12/05/23 79.2 kg (174 lb 8 oz)   08/20/23 77.1 kg (170 lb)   08/10/23 77.1 kg (170 lb)     GEN: well nourished, in no acute distress.  HEENT:  Pupils equal, round. Sclerae nonicteric.   NECK: Supple, no masses appreciated. No JVD with patient supine.  C/V:  Regular rate and rhythm, no murmur, rub or gallop.    RESP: Respirations are unlabored. Clear to auscultation bilaterally without wheezing, rales, or rhonchi.  GI: Abdomen soft, nontender.  EXTREM: No LE edema.  NEURO: Alert and oriented, cooperative.  SKIN: Warm and dry.        Data:     LIPID RESULTS:  Lab Results   Component Value Date    CHOL 183 09/27/2008    HDL 44 09/27/2008     09/27/2008    TRIG 55 11/23/2022    TRIG 100 09/27/2008    CHOLHDLRATIO 4.2 09/27/2008     LIVER ENZYME RESULTS:  Lab Results   Component Value Date    AST 28 08/20/2023     (H) 10/19/2020    ALT 29 08/20/2023     (H) 10/19/2020     CBC RESULTS:  Lab Results   Component Value Date    WBC 9.6 08/20/2023    WBC 6.1 10/19/2020    RBC 4.47 08/20/2023    RBC 4.82 10/19/2020    HGB 14.1 " "08/20/2023    HGB 14.8 10/19/2020    HCT 42.3 08/20/2023    HCT 44.1 10/19/2020    MCV 95 08/20/2023    MCV 92 10/19/2020    MCH 31.5 08/20/2023    MCH 30.7 10/19/2020    MCHC 33.3 08/20/2023    MCHC 33.6 10/19/2020    RDW 13.9 08/20/2023    RDW 13.9 10/19/2020     08/20/2023     10/19/2020     BMP RESULTS:  Lab Results   Component Value Date     08/20/2023     10/19/2020    POTASSIUM 4.2 08/20/2023    POTASSIUM 4.3 12/12/2022    POTASSIUM 3.6 10/19/2020    CHLORIDE 106 08/20/2023    CHLORIDE 109 12/12/2022    CHLORIDE 102 11/23/2022    CHLORIDE 100 10/19/2020    CO2 24 08/20/2023    CO2 27 12/12/2022    CO2 27 10/19/2020    ANIONGAP 12 08/20/2023    ANIONGAP 5 12/12/2022    ANIONGAP 7 10/19/2020     (H) 08/20/2023     (H) 02/18/2023    GLC 99 12/12/2022    GLC 98 10/19/2020    BUN 18.2 08/20/2023    BUN 15 12/12/2022    BUN 9 10/19/2020    CR 1.07 08/20/2023    CR 0.90 10/19/2020    GFRESTIMATED 77 08/20/2023    GFRESTIMATED >90 10/19/2020    GFRESTBLACK >90 10/19/2020    KAI 8.8 08/20/2023    KAI 8.7 10/19/2020      A1C RESULTS:  No results found for: \"A1C\"  INR RESULTS:  Lab Results   Component Value Date    INR 1.04 12/12/2022    INR 1.00 08/08/2005            Medications     Current Outpatient Medications   Medication Sig Dispense Refill    albuterol (PROVENTIL HFA: VENTOLIN HFA) 108 (90 BASE) MCG/ACT inhaler Inhale 2 puffs into the lungs every 6 hours as needed for shortness of breath / dyspnea. 1 Inhaler 6    aspirin (ASA) 81 MG EC tablet Take 1 tablet by mouth daily      atorvastatin (LIPITOR) 40 MG tablet Take 1 tablet (40 mg) by mouth daily. 90 tablet 3    azelastine (ASTELIN) 137 MCG/SPRAY nasal spray Reading 1 spray into both nostrils 2 times daily      FLUTICASONE PROPIONATE, NASAL, NA Instill 1 spray in both nostrils twice daily      fluticasone-vilanterol (BREO ELLIPTA) 100-25 MCG/ACT inhaler Inhale 1 puff into the lungs daily as needed      losartan (COZAAR) 50 " MG tablet Take 1 tablet (50 mg) by mouth at bedtime. 90 tablet 3    nitroGLYcerin (NITROSTAT) 0.4 MG sublingual tablet For chest pain place 1 tablet under the tongue every 5 minutes for 3 doses. If symptoms persist 5 minutes after 2nd dose call 911. 25 tablet 3    triamcinolone (KENALOG) 0.1 % ointment APPLY SPARINGLY TO HANDS TWICE DAILY AS NEEDED TO CONTROL CONDITION. 30 g 0          Past Medical History     Past Medical History:   Diagnosis Date    CAD (coronary artery disease)     Essential hypertension     Unspecified asthma(493.90) 1963     Past Surgical History:   Procedure Laterality Date    CV CORONARY ANGIOGRAM N/A 2022    Procedure: Coronary Angiogram;  Surgeon: Xavier Alonso MD;  Location: [facility] CARDIAC CATH LAB    CV FRACTIONAL FLOW RATIO WIRE N/A 2022    Procedure: Fractional Flow Ratio Wire;  Surgeon: Xavier Alonso MD;  Location: [facility] CARDIAC CATH LAB    CV INSTANTANEOUS WAVE-FREE RATIO N/A 2022    Procedure: Instantaneous Wave-Free Ratio;  Surgeon: Xavier Alonso MD;  Location: [facility] CARDIAC CATH LAB    CV INTRAVASULAR ULTRASOUND N/A 2022    Procedure: Intravascular Ultrasound;  Surgeon: Xavier Alonso MD;  Location: [facility] CARDIAC CATH LAB    CV PCI STENT DRUG ELUTING N/A 2022    Procedure: Percutaneous Coronary Intervention Stent;  Surgeon: Xavier Alonso MD;  Location: [facility] CARDIAC CATH LAB    ZZC NONSPECIFIC PROCEDURE      ORIF L middle finger     Family History   Problem Relation Age of Onset    Hypertension Mother         alive age 73 arthritis    Heart Disease Father         alive age 73    Musculoskeletal Disorder Sister          age 42 ALS    Neurologic Disorder Sister         alive age 43 migranes    Family History Negative Sister         alive age 41    Breast Cancer Paternal Aunt             Breast Cancer Paternal Aunt             Breast Cancer Paternal Aunt              Diabetes Maternal Aunt          late             Allergies   Erythromycin        Georgiana Gallagher NP  [facility] HEART [facility]

## 2025-01-13 NOTE — LETTER
1/13/2025    Sagar Mosley MD  [address]  [facility] [address]    RE: Timothy Balderas       Dear Colleague,     I had the pleasure of seeing Timothy Balderas in the [facility].    Cardiology Clinic Progress Note  Timothy Balderas MRN# [id number]   YOB: 1960 Age: 64 year old   Primary Cardiologist: Dr. Garcia Reason for visit: Annual follow up             Assessment and Plan:       1.  Coronary artery disease, s/p JOHN x 1 to LAD with mild nonobstructive disease elsewhere  -2022 NAN with normal LVEF  -No anginal symptoms    2.  Hypertension, well controlled     3.  Dyslipidemia  -Last lipid profile from 5/5/2022 showing borderline LDL at 77 and a normal ALT in 8/2023    Timothy is doing well from a cardiovascular standpoint without any symptoms suggestive of angina.  He continues to exercise routinely and is looking forward to being able to ski now that he is no longer on Plavix.  His blood pressure is well-controlled on losartan with a BMP showing normal values in August 2023.  He does follow routinely with his primary care provider at [facility] family physicians are in notes his cholesterol was checked within the last year.  We will try to get these results.  We discussed if his LDL still greater than 70 we will plan to intensify his atorvastatin to 80 mg daily.  I think is reasonable for him to follow-up in 1 year with Dr. Garcia or sooner if needed.    Plan:  Continue aspirin 81 mg daily and atorvastatin 40 mg daily  Continue losartan 50 mg daily    Follow up: Follow-up with Dr. Garcia in 1 year        History of Presenting Illness:    Timothy Balderas is a very pleasant 64 year old male with a history of premature CAD with his father having coronary disease in his early 50s and undergoing bypass, hyperlipidemia and previously noted coronary calcifications on CT scan.     Patient was initially seen by Dr. Garcia in 2022 for consultation due to exertional chest discomfort and  abnormal stress EKG.  There were EKG changes present, however no ischemia noted on the echocardiogram portion.  A subsequent coronary angiogram showed single-vessel disease of the proximal to mid LAD that was hemodynamically significant treated with PCI and JOHN x 1 and mild nonobstructive disease elsewhere.    In February 2023 he was hospitalized after traumatic hematoma while skiing.  He had a significant hemoglobin drop ultimately was resumed on DAPT while inpatient.  During that admission his metoprolol and losartan were discontinued due to symptomatic hypotension.    He was seen most recently in clinic by Dr. Garcia in December 2023 at which time he was stable from a cardiovascular standpoint and his Plavix was discontinued.    Patient is here today for an annual follow up. Patient reports feeling good. Patient denies chest pain or chest tightness. Denies dizziness, lightheadedness or other presyncopal symptoms. Denies tachycardia or palpitations. Denies shortness of breath, orthopnea, or PND.    Most recent labs from August 2023 show sodium 142, potassium 4.2, BUN 18.2, creatinine 1.07, GFR 77, hemoglobin 14.1 platelets 244, ALT 29.    Blood pressure 136/78 and HR 60 in clinic today.  Denies any bleeding or bruising concerns on aspirin.    Diet: limits red meat  Exercise: 4-5 days a week at home on exercise bike and strength training   Alcohol: occasionally   Tobacco: none         Recent Hospitalizations   None in 2024           Social History      Social History     Socioeconomic History     Marital status:      Spouse name: Jeannette     Number of children: 3     Years of education: Not on file     Highest education level: Not on file   Occupational History     Occupation: clinical research manager     Comment: [employer]   Tobacco Use     Smoking status: Never     Smokeless tobacco: Never   Vaping Use     Vaping status: Never Used   Substance and Sexual Activity     Alcohol use: Yes     Comment:  "occasional     Drug use: No     Sexual activity: Yes     Partners: Female   Other Topics Concern     Not on file   Social History Narrative     Not on file     Social Drivers of Health     Financial Resource Strain: Not on file   Food Insecurity: Not on file   Transportation Needs: Not on file   Physical Activity: Not on file   Stress: Not on file   Social Connections: Not on file   Interpersonal Safety: Not on file   Housing Stability: Not on file            Review of Systems:   Skin:  Negative for bruising   Eyes:       ENT:       Respiratory:  Negative for dyspnea on exertion, shortness of breath, dyspnea at rest  Cardiovascular:  Negative for, palpitations, chest pain, edema, syncope or near-syncope, dizziness, lightheadedness, exercise intolerance    Gastroenterology:      Genitourinary:       Musculoskeletal:       Neurologic:       Psychiatric:       Heme/Lymph/Imm:       Endocrine:              Physical Exam:   Vitals: /78   Pulse 60   Ht 1.715 m (5' 7.5\")   Wt 80.1 kg (176 lb 9.6 oz)   BMI 27.25 kg/m     Wt Readings from Last 4 Encounters:   01/13/25 80.1 kg (176 lb 9.6 oz)   12/05/23 79.2 kg (174 lb 8 oz)   08/20/23 77.1 kg (170 lb)   08/10/23 77.1 kg (170 lb)     GEN: well nourished, in no acute distress.  HEENT:  Pupils equal, round. Sclerae nonicteric.   NECK: Supple, no masses appreciated. No JVD with patient supine.  C/V:  Regular rate and rhythm, no murmur, rub or gallop.    RESP: Respirations are unlabored. Clear to auscultation bilaterally without wheezing, rales, or rhonchi.  GI: Abdomen soft, nontender.  EXTREM: No LE edema.  NEURO: Alert and oriented, cooperative.  SKIN: Warm and dry.        Data:     LIPID RESULTS:  Lab Results   Component Value Date    CHOL 183 09/27/2008    HDL 44 09/27/2008     09/27/2008    TRIG 55 11/23/2022    TRIG 100 09/27/2008    CHOLHDLRATIO 4.2 09/27/2008     LIVER ENZYME RESULTS:  Lab Results   Component Value Date    AST 28 08/20/2023     " "(H) 10/19/2020    ALT 29 08/20/2023     (H) 10/19/2020     CBC RESULTS:  Lab Results   Component Value Date    WBC 9.6 08/20/2023    WBC 6.1 10/19/2020    RBC 4.47 08/20/2023    RBC 4.82 10/19/2020    HGB 14.1 08/20/2023    HGB 14.8 10/19/2020    HCT 42.3 08/20/2023    HCT 44.1 10/19/2020    MCV 95 08/20/2023    MCV 92 10/19/2020    MCH 31.5 08/20/2023    MCH 30.7 10/19/2020    MCHC 33.3 08/20/2023    MCHC 33.6 10/19/2020    RDW 13.9 08/20/2023    RDW 13.9 10/19/2020     08/20/2023     10/19/2020     BMP RESULTS:  Lab Results   Component Value Date     08/20/2023     10/19/2020    POTASSIUM 4.2 08/20/2023    POTASSIUM 4.3 12/12/2022    POTASSIUM 3.6 10/19/2020    CHLORIDE 106 08/20/2023    CHLORIDE 109 12/12/2022    CHLORIDE 102 11/23/2022    CHLORIDE 100 10/19/2020    CO2 24 08/20/2023    CO2 27 12/12/2022    CO2 27 10/19/2020    ANIONGAP 12 08/20/2023    ANIONGAP 5 12/12/2022    ANIONGAP 7 10/19/2020     (H) 08/20/2023     (H) 02/18/2023    GLC 99 12/12/2022    GLC 98 10/19/2020    BUN 18.2 08/20/2023    BUN 15 12/12/2022    BUN 9 10/19/2020    CR 1.07 08/20/2023    CR 0.90 10/19/2020    GFRESTIMATED 77 08/20/2023    GFRESTIMATED >90 10/19/2020    GFRESTBLACK >90 10/19/2020    KAI 8.8 08/20/2023    KAI 8.7 10/19/2020      A1C RESULTS:  No results found for: \"A1C\"  INR RESULTS:  Lab Results   Component Value Date    INR 1.04 12/12/2022    INR 1.00 08/08/2005            Medications     Current Outpatient Medications   Medication Sig Dispense Refill     albuterol (PROVENTIL HFA: VENTOLIN HFA) 108 (90 BASE) MCG/ACT inhaler Inhale 2 puffs into the lungs every 6 hours as needed for shortness of breath / dyspnea. 1 Inhaler 6     aspirin (ASA) 81 MG EC tablet Take 1 tablet by mouth daily       atorvastatin (LIPITOR) 40 MG tablet Take 1 tablet (40 mg) by mouth daily. 90 tablet 3     azelastine (ASTELIN) 137 MCG/SPRAY nasal spray [place] 1 spray into both nostrils 2 times daily   "     FLUTICASONE PROPIONATE, NASAL, NA Instill 1 spray in both nostrils twice daily       fluticasone-vilanterol (BREO ELLIPTA) 100-25 MCG/ACT inhaler Inhale 1 puff into the lungs daily as needed       losartan (COZAAR) 50 MG tablet Take 1 tablet (50 mg) by mouth at bedtime. 90 tablet 3     nitroGLYcerin (NITROSTAT) 0.4 MG sublingual tablet For chest pain place 1 tablet under the tongue every 5 minutes for 3 doses. If symptoms persist 5 minutes after 2nd dose call 911. 25 tablet 3     triamcinolone (KENALOG) 0.1 % ointment APPLY SPARINGLY TO HANDS TWICE DAILY AS NEEDED TO CONTROL CONDITION. 30 g 0          Past Medical History     Past Medical History:   Diagnosis Date     CAD (coronary artery disease)      Essential hypertension      Unspecified asthma(493.90) 1963     Past Surgical History:   Procedure Laterality Date     CV CORONARY ANGIOGRAM N/A 2022    Procedure: Coronary Angiogram;  Surgeon: Xavier Alonso MD;  Location: [facility] CARDIAC CATH LAB     CV FRACTIONAL FLOW RATIO WIRE N/A 2022    Procedure: Fractional Flow Ratio Wire;  Surgeon: Xavier Alonso MD;  Location: [facility] CARDIAC CATH LAB     CV INSTANTANEOUS WAVE-FREE RATIO N/A 2022    Procedure: Instantaneous Wave-Free Ratio;  Surgeon: Xavier Alonso MD;  Location: [facility] CARDIAC CATH LAB     CV INTRAVASULAR ULTRASOUND N/A 2022    Procedure: Intravascular Ultrasound;  Surgeon: Xavier Alonso MD;  Location: [facility] CARDIAC CATH LAB     CV PCI STENT DRUG ELUTING N/A 2022    Procedure: Percutaneous Coronary Intervention Stent;  Surgeon: Xavier Alonso MD;  Location:  HEART CARDIAC CATH LAB     ZZC NONSPECIFIC PROCEDURE      ORIF L middle finger     Family History   Problem Relation Age of Onset     Hypertension Mother         alive age 73 arthritis     Heart Disease Father         alive age 73     Musculoskeletal Disorder Sister          age 42 ALS      Neurologic Disorder Sister         alive age 43 migranes     Family History Negative Sister         alive age 41     Breast Cancer Paternal Aunt              Breast Cancer Paternal Aunt              Breast Cancer Paternal Aunt              Diabetes Maternal Aunt          late 20's            Allergies   Erythromycin        Georgiana Gallagher NP  [facility]       Thank you for allowing me to participate in the care of your patient.      Sincerely,     Georgiana Gallagher NP     [facility]  cc:   Deric Garcia MD  [address]  [place] [address]

## 2025-01-15 ENCOUNTER — CARE COORDINATION (OUTPATIENT)
Dept: CARDIOLOGY | Facility: CLINIC | Age: 65
End: 2025-01-15
Payer: COMMERCIAL

## 2025-01-15 NOTE — PROGRESS NOTES
Faxed records request to [facility] for BMP, Lipid, ALT results available that have been done since 1/2024 - present per request by Marylu Gallagher NP. Paige Jones RN on 1/15/2025 at 12:24 PM

## 2025-01-25 ENCOUNTER — HEALTH MAINTENANCE LETTER (OUTPATIENT)
Age: 65
End: 2025-01-25

## (undated) DEVICE — CATH RX TAKERU PTCA BALLOON 2.00MM X 15MM

## (undated) DEVICE — GUIDEWIRE VASC 0.014INX180CM RUNTHROUGH 25-1011

## (undated) DEVICE — MANIFOLD KIT ANGIO AUTOMATED 014613

## (undated) DEVICE — INTRO GLIDESHEATH SLENDER 6FR 10X45CM 60-1060

## (undated) DEVICE — CATH LAUNCHER 6FR EBU 30 LA6EBU30

## (undated) DEVICE — TOTE ANGIO CORP PC15AT SAN32CC83O

## (undated) DEVICE — GW VASC OMNIWIRE J L185CM PRESSURE 89185J

## (undated) DEVICE — RAD INFLATOR BASIC COMPAK  IN4130

## (undated) DEVICE — CATH BALLOON NC EMERGE 2.50X20MM H7493926720250

## (undated) DEVICE — CATH BALLOON EMERGE 2.5X15MM H7493918915250

## (undated) DEVICE — SLEEVE TR BAND RADIAL COMPRESSION DEVICE 24CM TRB24-REG

## (undated) DEVICE — CATH BALLOON NC EMERGE 3.25X12MM H7493926712320

## (undated) DEVICE — DEFIB PRO-PADZ LVP LQD GEL ADULT 8900-2105-01

## (undated) DEVICE — KIT LG BORE TOUHY ACCESS PLUS MAP152

## (undated) DEVICE — CATH US OD 5FR OD SEC 2.9FR EAGLE EYE PLATINUM 0.014 85900P

## (undated) DEVICE — CATH BALLOON NC EMERGE 3.00X15MM H7493926715300

## (undated) DEVICE — WIRE GUIDE 0.035"X260CM SAFE-T-J EXCHANGE G00517

## (undated) DEVICE — CATH BALLOON EMERGE 2.25X20MMH7493918920220

## (undated) DEVICE — CATH DIAGNOSTIC RADIAL 5FR TIG 4.0

## (undated) DEVICE — KIT HAND CONTROL ANGIOTOUCH ACIST 65CM AT-P65

## (undated) RX ORDER — FENTANYL CITRATE 50 UG/ML
INJECTION, SOLUTION INTRAMUSCULAR; INTRAVENOUS
Status: DISPENSED
Start: 2022-12-12

## (undated) RX ORDER — HEPARIN SODIUM 1000 [USP'U]/ML
INJECTION, SOLUTION INTRAVENOUS; SUBCUTANEOUS
Status: DISPENSED
Start: 2022-12-12

## (undated) RX ORDER — VERAPAMIL HYDROCHLORIDE 2.5 MG/ML
INJECTION, SOLUTION INTRAVENOUS
Status: DISPENSED
Start: 2022-12-12

## (undated) RX ORDER — LIDOCAINE HYDROCHLORIDE 10 MG/ML
INJECTION, SOLUTION EPIDURAL; INFILTRATION; INTRACAUDAL; PERINEURAL
Status: DISPENSED
Start: 2022-12-12

## (undated) RX ORDER — NITROGLYCERIN 5 MG/ML
VIAL (ML) INTRAVENOUS
Status: DISPENSED
Start: 2022-12-12

## (undated) RX ORDER — HEPARIN SODIUM 200 [USP'U]/100ML
INJECTION, SOLUTION INTRAVENOUS
Status: DISPENSED
Start: 2022-12-12